# Patient Record
Sex: MALE | Race: BLACK OR AFRICAN AMERICAN | Employment: FULL TIME | URBAN - METROPOLITAN AREA
[De-identification: names, ages, dates, MRNs, and addresses within clinical notes are randomized per-mention and may not be internally consistent; named-entity substitution may affect disease eponyms.]

---

## 2018-10-24 ENCOUNTER — APPOINTMENT (OUTPATIENT)
Dept: GENERAL RADIOLOGY | Age: 48
DRG: 292 | End: 2018-10-24

## 2018-10-24 ENCOUNTER — HOSPITAL ENCOUNTER (INPATIENT)
Age: 48
LOS: 1 days | Discharge: HOME | DRG: 292 | End: 2018-10-25
Attending: EMERGENCY MEDICINE | Admitting: FAMILY MEDICINE

## 2018-10-24 DIAGNOSIS — I50.23 ACUTE ON CHRONIC SYSTOLIC CONGESTIVE HEART FAILURE (HCC): Primary | ICD-10-CM

## 2018-10-24 PROBLEM — I50.9 HEART FAILURE (HCC): Status: ACTIVE | Noted: 2018-10-24

## 2018-10-24 LAB
A/G RATIO: 1.5 (ref 1.1–2.2)
ALBUMIN SERPL-MCNC: 4.1 G/DL (ref 3.4–5)
ALP BLD-CCNC: 85 U/L (ref 40–129)
ALT SERPL-CCNC: 20 U/L (ref 10–40)
ANION GAP SERPL CALCULATED.3IONS-SCNC: 14 MMOL/L (ref 3–16)
AST SERPL-CCNC: 28 U/L (ref 15–37)
BASE EXCESS VENOUS: 1 (ref -3–3)
BASOPHILS ABSOLUTE: 0.1 K/UL (ref 0–0.2)
BASOPHILS RELATIVE PERCENT: 0.7 %
BILIRUB SERPL-MCNC: 0.3 MG/DL (ref 0–1)
BUN BLDV-MCNC: 11 MG/DL (ref 7–20)
CALCIUM SERPL-MCNC: 9.3 MG/DL (ref 8.3–10.6)
CHLORIDE BLD-SCNC: 101 MMOL/L (ref 99–110)
CO2: 24 MMOL/L (ref 21–32)
CREAT SERPL-MCNC: 1.1 MG/DL (ref 0.9–1.3)
EOSINOPHILS ABSOLUTE: 0.1 K/UL (ref 0–0.6)
EOSINOPHILS RELATIVE PERCENT: 1.6 %
GFR AFRICAN AMERICAN: >60
GFR NON-AFRICAN AMERICAN: >60
GLOBULIN: 2.8 G/DL
GLUCOSE BLD-MCNC: 159 MG/DL (ref 70–99)
HCO3 VENOUS: 25.9 MMOL/L (ref 23–29)
HCT VFR BLD CALC: 42.5 % (ref 40.5–52.5)
HEMOGLOBIN: 14 G/DL (ref 13.5–17.5)
LYMPHOCYTES ABSOLUTE: 1.3 K/UL (ref 1–5.1)
LYMPHOCYTES RELATIVE PERCENT: 17.9 %
MCH RBC QN AUTO: 30.6 PG (ref 26–34)
MCHC RBC AUTO-ENTMCNC: 33 G/DL (ref 31–36)
MCV RBC AUTO: 92.8 FL (ref 80–100)
MONOCYTES ABSOLUTE: 0.5 K/UL (ref 0–1.3)
MONOCYTES RELATIVE PERCENT: 7 %
NEUTROPHILS ABSOLUTE: 5.4 K/UL (ref 1.7–7.7)
NEUTROPHILS RELATIVE PERCENT: 72.8 %
O2 SAT, VEN: 86 %
PCO2, VEN: 41.4 MM HG (ref 40–50)
PDW BLD-RTO: 13.4 % (ref 12.4–15.4)
PERFORMED ON: NORMAL
PH VENOUS: 7.4 (ref 7.35–7.45)
PLATELET # BLD: 222 K/UL (ref 135–450)
PMV BLD AUTO: 7.8 FL (ref 5–10.5)
PO2, VEN: 52 MM HG
POC SAMPLE TYPE: NORMAL
POTASSIUM REFLEX MAGNESIUM: 4.2 MMOL/L (ref 3.5–5.1)
PRO-BNP: 544 PG/ML (ref 0–124)
RBC # BLD: 4.58 M/UL (ref 4.2–5.9)
SODIUM BLD-SCNC: 139 MMOL/L (ref 136–145)
TCO2 CALC VENOUS: 27 MMOL/L
TOTAL PROTEIN: 6.9 G/DL (ref 6.4–8.2)
TROPONIN: <0.01 NG/ML
WBC # BLD: 7.5 K/UL (ref 4–11)

## 2018-10-24 PROCEDURE — 71046 X-RAY EXAM CHEST 2 VIEWS: CPT

## 2018-10-24 PROCEDURE — 99285 EMERGENCY DEPT VISIT HI MDM: CPT

## 2018-10-24 PROCEDURE — 84484 ASSAY OF TROPONIN QUANT: CPT

## 2018-10-24 PROCEDURE — 93005 ELECTROCARDIOGRAM TRACING: CPT | Performed by: EMERGENCY MEDICINE

## 2018-10-24 PROCEDURE — 96375 TX/PRO/DX INJ NEW DRUG ADDON: CPT

## 2018-10-24 PROCEDURE — 6360000002 HC RX W HCPCS: Performed by: EMERGENCY MEDICINE

## 2018-10-24 PROCEDURE — 82803 BLOOD GASES ANY COMBINATION: CPT

## 2018-10-24 PROCEDURE — 80053 COMPREHEN METABOLIC PANEL: CPT

## 2018-10-24 PROCEDURE — 96374 THER/PROPH/DIAG INJ IV PUSH: CPT

## 2018-10-24 PROCEDURE — 85025 COMPLETE CBC W/AUTO DIFF WBC: CPT

## 2018-10-24 PROCEDURE — 2580000003 HC RX 258: Performed by: STUDENT IN AN ORGANIZED HEALTH CARE EDUCATION/TRAINING PROGRAM

## 2018-10-24 PROCEDURE — 1200000000 HC SEMI PRIVATE

## 2018-10-24 PROCEDURE — 6370000000 HC RX 637 (ALT 250 FOR IP): Performed by: STUDENT IN AN ORGANIZED HEALTH CARE EDUCATION/TRAINING PROGRAM

## 2018-10-24 PROCEDURE — 6370000000 HC RX 637 (ALT 250 FOR IP): Performed by: EMERGENCY MEDICINE

## 2018-10-24 PROCEDURE — 6360000002 HC RX W HCPCS: Performed by: STUDENT IN AN ORGANIZED HEALTH CARE EDUCATION/TRAINING PROGRAM

## 2018-10-24 PROCEDURE — 83880 ASSAY OF NATRIURETIC PEPTIDE: CPT

## 2018-10-24 RX ORDER — FUROSEMIDE 10 MG/ML
40 INJECTION INTRAMUSCULAR; INTRAVENOUS ONCE
Status: COMPLETED | OUTPATIENT
Start: 2018-10-24 | End: 2018-10-24

## 2018-10-24 RX ORDER — METOLAZONE 2.5 MG/1
2.5 TABLET ORAL DAILY
Status: DISCONTINUED | OUTPATIENT
Start: 2018-10-25 | End: 2018-10-25 | Stop reason: HOSPADM

## 2018-10-24 RX ORDER — SODIUM CHLORIDE 0.9 % (FLUSH) 0.9 %
10 SYRINGE (ML) INJECTION EVERY 12 HOURS SCHEDULED
Status: DISCONTINUED | OUTPATIENT
Start: 2018-10-24 | End: 2018-10-25 | Stop reason: HOSPADM

## 2018-10-24 RX ORDER — LISINOPRIL 20 MG/1
20 TABLET ORAL ONCE
Status: COMPLETED | OUTPATIENT
Start: 2018-10-24 | End: 2018-10-24

## 2018-10-24 RX ORDER — ONDANSETRON 2 MG/ML
4 INJECTION INTRAMUSCULAR; INTRAVENOUS EVERY 6 HOURS PRN
Status: DISCONTINUED | OUTPATIENT
Start: 2018-10-24 | End: 2018-10-25 | Stop reason: HOSPADM

## 2018-10-24 RX ORDER — CARVEDILOL 3.12 MG/1
3.12 TABLET ORAL 2 TIMES DAILY WITH MEALS
Status: DISCONTINUED | OUTPATIENT
Start: 2018-10-25 | End: 2018-10-25 | Stop reason: HOSPADM

## 2018-10-24 RX ORDER — SODIUM CHLORIDE 0.9 % (FLUSH) 0.9 %
10 SYRINGE (ML) INJECTION PRN
Status: DISCONTINUED | OUTPATIENT
Start: 2018-10-24 | End: 2018-10-25 | Stop reason: HOSPADM

## 2018-10-24 RX ORDER — FUROSEMIDE 10 MG/ML
40 INJECTION INTRAMUSCULAR; INTRAVENOUS 2 TIMES DAILY
Status: DISCONTINUED | OUTPATIENT
Start: 2018-10-24 | End: 2018-10-25

## 2018-10-24 RX ORDER — KETOROLAC TROMETHAMINE 30 MG/ML
15 INJECTION, SOLUTION INTRAMUSCULAR; INTRAVENOUS ONCE
Status: COMPLETED | OUTPATIENT
Start: 2018-10-24 | End: 2018-10-24

## 2018-10-24 RX ORDER — ACETAMINOPHEN 500 MG
1000 TABLET ORAL EVERY 6 HOURS PRN
Status: DISCONTINUED | OUTPATIENT
Start: 2018-10-24 | End: 2018-10-25 | Stop reason: HOSPADM

## 2018-10-24 RX ORDER — LISINOPRIL 5 MG/1
5 TABLET ORAL DAILY
Status: DISCONTINUED | OUTPATIENT
Start: 2018-10-25 | End: 2018-10-25 | Stop reason: HOSPADM

## 2018-10-24 RX ADMIN — ACETAMINOPHEN 1000 MG: 500 TABLET ORAL at 23:51

## 2018-10-24 RX ADMIN — FUROSEMIDE 40 MG: 10 INJECTION, SOLUTION INTRAMUSCULAR; INTRAVENOUS at 23:51

## 2018-10-24 RX ADMIN — ASPIRIN 325 MG: 325 TABLET, DELAYED RELEASE ORAL at 18:26

## 2018-10-24 RX ADMIN — LISINOPRIL 20 MG: 20 TABLET ORAL at 18:26

## 2018-10-24 RX ADMIN — KETOROLAC TROMETHAMINE 15 MG: 30 INJECTION, SOLUTION INTRAMUSCULAR at 17:03

## 2018-10-24 RX ADMIN — FUROSEMIDE 40 MG: 10 INJECTION, SOLUTION INTRAMUSCULAR; INTRAVENOUS at 18:26

## 2018-10-24 RX ADMIN — Medication 10 ML: at 23:51

## 2018-10-24 ASSESSMENT — ENCOUNTER SYMPTOMS
DIARRHEA: 0
VOMITING: 0
ABDOMINAL PAIN: 0
SHORTNESS OF BREATH: 1
COUGH: 0
NAUSEA: 0

## 2018-10-24 ASSESSMENT — PAIN DESCRIPTION - FREQUENCY: FREQUENCY: INTERMITTENT

## 2018-10-24 ASSESSMENT — PAIN DESCRIPTION - PROGRESSION: CLINICAL_PROGRESSION: GRADUALLY WORSENING

## 2018-10-24 ASSESSMENT — PAIN DESCRIPTION - LOCATION: LOCATION: FOOT

## 2018-10-24 ASSESSMENT — PAIN DESCRIPTION - ONSET: ONSET: GRADUAL

## 2018-10-24 ASSESSMENT — PAIN DESCRIPTION - PAIN TYPE: TYPE: NEUROPATHIC PAIN

## 2018-10-24 ASSESSMENT — PAIN SCALES - GENERAL
PAINLEVEL_OUTOF10: 7
PAINLEVEL_OUTOF10: 5

## 2018-10-24 ASSESSMENT — PAIN DESCRIPTION - DESCRIPTORS: DESCRIPTORS: ACHING;BURNING;CONSTANT

## 2018-10-24 ASSESSMENT — PAIN DESCRIPTION - ORIENTATION: ORIENTATION: RIGHT;LEFT

## 2018-10-25 VITALS
DIASTOLIC BLOOD PRESSURE: 71 MMHG | OXYGEN SATURATION: 95 % | WEIGHT: 315 LBS | BODY MASS INDEX: 42.66 KG/M2 | TEMPERATURE: 97.9 F | RESPIRATION RATE: 20 BRPM | HEIGHT: 72 IN | SYSTOLIC BLOOD PRESSURE: 137 MMHG | HEART RATE: 91 BPM

## 2018-10-25 LAB
ANION GAP SERPL CALCULATED.3IONS-SCNC: 16 MMOL/L (ref 3–16)
BASOPHILS ABSOLUTE: 0 K/UL (ref 0–0.2)
BASOPHILS RELATIVE PERCENT: 0.5 %
BILIRUBIN URINE: NEGATIVE
BLOOD, URINE: NEGATIVE
BUN BLDV-MCNC: 14 MG/DL (ref 7–20)
CALCIUM SERPL-MCNC: 9.9 MG/DL (ref 8.3–10.6)
CHLORIDE BLD-SCNC: 101 MMOL/L (ref 99–110)
CHOLESTEROL, TOTAL: 244 MG/DL (ref 0–199)
CLARITY: CLEAR
CO2: 22 MMOL/L (ref 21–32)
COLOR: YELLOW
CREAT SERPL-MCNC: 1.1 MG/DL (ref 0.9–1.3)
EOSINOPHILS ABSOLUTE: 0.2 K/UL (ref 0–0.6)
EOSINOPHILS RELATIVE PERCENT: 2.5 %
GFR AFRICAN AMERICAN: >60
GFR NON-AFRICAN AMERICAN: >60
GLUCOSE BLD-MCNC: 105 MG/DL (ref 70–99)
GLUCOSE BLD-MCNC: 167 MG/DL (ref 70–99)
GLUCOSE URINE: NEGATIVE MG/DL
HCT VFR BLD CALC: 43.3 % (ref 40.5–52.5)
HDLC SERPL-MCNC: 43 MG/DL (ref 40–60)
HEMOGLOBIN: 14.3 G/DL (ref 13.5–17.5)
KETONES, URINE: NEGATIVE MG/DL
LDL CHOLESTEROL CALCULATED: 153 MG/DL
LEUKOCYTE ESTERASE, URINE: NEGATIVE
LV EF: 23 %
LVEF MODALITY: NORMAL
LYMPHOCYTES ABSOLUTE: 1.6 K/UL (ref 1–5.1)
LYMPHOCYTES RELATIVE PERCENT: 22.9 %
MAGNESIUM: 2 MG/DL (ref 1.8–2.4)
MCH RBC QN AUTO: 31.3 PG (ref 26–34)
MCHC RBC AUTO-ENTMCNC: 32.9 G/DL (ref 31–36)
MCV RBC AUTO: 95 FL (ref 80–100)
MICROSCOPIC EXAMINATION: NORMAL
MONOCYTES ABSOLUTE: 0.5 K/UL (ref 0–1.3)
MONOCYTES RELATIVE PERCENT: 7.6 %
NEUTROPHILS ABSOLUTE: 4.6 K/UL (ref 1.7–7.7)
NEUTROPHILS RELATIVE PERCENT: 66.5 %
NITRITE, URINE: NEGATIVE
PDW BLD-RTO: 13.7 % (ref 12.4–15.4)
PERFORMED ON: ABNORMAL
PH UA: 6
PLATELET # BLD: 241 K/UL (ref 135–450)
PMV BLD AUTO: 7.8 FL (ref 5–10.5)
POTASSIUM SERPL-SCNC: 3.8 MMOL/L (ref 3.5–5.1)
PROTEIN UA: NEGATIVE MG/DL
RBC # BLD: 4.56 M/UL (ref 4.2–5.9)
SODIUM BLD-SCNC: 139 MMOL/L (ref 136–145)
SPECIFIC GRAVITY UA: 1.02
TRIGL SERPL-MCNC: 238 MG/DL (ref 0–150)
URINE TYPE: NORMAL
UROBILINOGEN, URINE: 0.2 E.U./DL
VLDLC SERPL CALC-MCNC: 48 MG/DL
WBC # BLD: 7 K/UL (ref 4–11)

## 2018-10-25 PROCEDURE — 6360000002 HC RX W HCPCS: Performed by: STUDENT IN AN ORGANIZED HEALTH CARE EDUCATION/TRAINING PROGRAM

## 2018-10-25 PROCEDURE — 2580000003 HC RX 258: Performed by: STUDENT IN AN ORGANIZED HEALTH CARE EDUCATION/TRAINING PROGRAM

## 2018-10-25 PROCEDURE — 6370000000 HC RX 637 (ALT 250 FOR IP): Performed by: FAMILY MEDICINE

## 2018-10-25 PROCEDURE — 81003 URINALYSIS AUTO W/O SCOPE: CPT

## 2018-10-25 PROCEDURE — 80048 BASIC METABOLIC PNL TOTAL CA: CPT

## 2018-10-25 PROCEDURE — 6370000000 HC RX 637 (ALT 250 FOR IP): Performed by: STUDENT IN AN ORGANIZED HEALTH CARE EDUCATION/TRAINING PROGRAM

## 2018-10-25 PROCEDURE — 80061 LIPID PANEL: CPT

## 2018-10-25 PROCEDURE — 85025 COMPLETE CBC W/AUTO DIFF WBC: CPT

## 2018-10-25 PROCEDURE — 83735 ASSAY OF MAGNESIUM: CPT

## 2018-10-25 PROCEDURE — 83036 HEMOGLOBIN GLYCOSYLATED A1C: CPT

## 2018-10-25 PROCEDURE — 6360000004 HC RX CONTRAST MEDICATION: Performed by: INTERNAL MEDICINE

## 2018-10-25 PROCEDURE — C8929 TTE W OR WO FOL WCON,DOPPLER: HCPCS

## 2018-10-25 PROCEDURE — 36415 COLL VENOUS BLD VENIPUNCTURE: CPT

## 2018-10-25 RX ORDER — ATORVASTATIN CALCIUM 40 MG/1
40 TABLET, FILM COATED ORAL DAILY
Qty: 30 TABLET | Refills: 3 | Status: SHIPPED | OUTPATIENT
Start: 2018-10-25 | End: 2019-04-29 | Stop reason: ALTCHOICE

## 2018-10-25 RX ORDER — CARVEDILOL 3.12 MG/1
3.12 TABLET ORAL 2 TIMES DAILY WITH MEALS
Qty: 60 TABLET | Refills: 3 | Status: SHIPPED | OUTPATIENT
Start: 2018-10-25 | End: 2018-11-13 | Stop reason: ALTCHOICE

## 2018-10-25 RX ORDER — LISINOPRIL 5 MG/1
5 TABLET ORAL DAILY
Qty: 30 TABLET | Refills: 3 | Status: SHIPPED | OUTPATIENT
Start: 2018-10-26 | End: 2018-11-13 | Stop reason: SINTOL

## 2018-10-25 RX ORDER — FUROSEMIDE 40 MG/1
40 TABLET ORAL DAILY
Qty: 60 TABLET | Refills: 3 | Status: SHIPPED | OUTPATIENT
Start: 2018-10-26 | End: 2018-10-25

## 2018-10-25 RX ORDER — FUROSEMIDE 40 MG/1
40 TABLET ORAL DAILY
Status: DISCONTINUED | OUTPATIENT
Start: 2018-10-26 | End: 2018-10-25 | Stop reason: HOSPADM

## 2018-10-25 RX ORDER — FUROSEMIDE 40 MG/1
40 TABLET ORAL 2 TIMES DAILY
Qty: 60 TABLET | Refills: 3 | Status: SHIPPED | OUTPATIENT
Start: 2018-10-25 | End: 2019-03-11 | Stop reason: SDUPTHER

## 2018-10-25 RX ORDER — FUROSEMIDE 40 MG/1
40 TABLET ORAL ONCE
Status: COMPLETED | OUTPATIENT
Start: 2018-10-25 | End: 2018-10-25

## 2018-10-25 RX ADMIN — FUROSEMIDE 40 MG: 10 INJECTION, SOLUTION INTRAMUSCULAR; INTRAVENOUS at 08:38

## 2018-10-25 RX ADMIN — FUROSEMIDE 40 MG: 40 TABLET ORAL at 16:40

## 2018-10-25 RX ADMIN — CARVEDILOL 3.12 MG: 3.12 TABLET, FILM COATED ORAL at 16:40

## 2018-10-25 RX ADMIN — LISINOPRIL 5 MG: 5 TABLET ORAL at 08:38

## 2018-10-25 RX ADMIN — Medication 10 ML: at 08:38

## 2018-10-25 RX ADMIN — METOLAZONE 2.5 MG: 2.5 TABLET ORAL at 00:07

## 2018-10-25 RX ADMIN — CARVEDILOL 3.12 MG: 3.12 TABLET, FILM COATED ORAL at 08:38

## 2018-10-25 RX ADMIN — PERFLUTREN 2.2 MG: 6.52 INJECTION, SUSPENSION INTRAVENOUS at 10:33

## 2018-10-25 ASSESSMENT — PAIN DESCRIPTION - ONSET: ONSET: ON-GOING

## 2018-10-25 ASSESSMENT — PAIN DESCRIPTION - PROGRESSION: CLINICAL_PROGRESSION: NOT CHANGED

## 2018-10-25 ASSESSMENT — PAIN SCALES - GENERAL
PAINLEVEL_OUTOF10: 0
PAINLEVEL_OUTOF10: 5
PAINLEVEL_OUTOF10: 0

## 2018-10-25 ASSESSMENT — PAIN DESCRIPTION - FREQUENCY: FREQUENCY: INTERMITTENT

## 2018-10-25 ASSESSMENT — PAIN DESCRIPTION - LOCATION: LOCATION: FOOT

## 2018-10-25 ASSESSMENT — PAIN DESCRIPTION - DESCRIPTORS: DESCRIPTORS: ACHING;BURNING;CONSTANT

## 2018-10-25 ASSESSMENT — PAIN DESCRIPTION - ORIENTATION: ORIENTATION: RIGHT;LEFT

## 2018-10-25 ASSESSMENT — PAIN DESCRIPTION - PAIN TYPE: TYPE: NEUROPATHIC PAIN

## 2018-10-25 NOTE — PLAN OF CARE
Problem: Pain:  Goal: Pain level will decrease  Pain level will decrease   Outcome: Ongoing  Pt reports intermittent sciatic pain in legs, however refused AM dose of diclofenac sodium topical gel. Will continue to monitor pain level. Problem: Safety:  Goal: Free from accidental physical injury  Free from accidental physical injury   Outcome: Ongoing  Pt is a low Fall Risk. See Namon Ditto Fall Risk Score. Pt bed in low position, bed wheels locked, non-skid socks in use, and 2/4 side rails up. Pt UAL with steady gait observed. Pt denies dizziness during ambulation, but does report mild SOB with exertion. Call light and belongings left within reach. Pt encouraged to call for assistance. Will continue with hourly rounds for PO intake, pain needs, toileting, and repositioning as needed. Problem: OXYGENATION/RESPIRATORY FUNCTION  Goal: Patient will maintain patent airway  Outcome: Ongoing  SpO2 level 96% on room air. Lungs are clear/diminished to auscultation. 2+ pitting edema present in BLE; pt encouraged to elevate legs while in bed/chair. Pt receiving IV Lasix BID as ordered. I/O being monitored closely. Will continue to monitor and report changes to physician.

## 2018-10-26 LAB
EKG ATRIAL RATE: 102 BPM
EKG DIAGNOSIS: NORMAL
EKG P AXIS: 47 DEGREES
EKG P-R INTERVAL: 168 MS
EKG Q-T INTERVAL: 366 MS
EKG QRS DURATION: 88 MS
EKG QTC CALCULATION (BAZETT): 477 MS
EKG R AXIS: -53 DEGREES
EKG T AXIS: 80 DEGREES
EKG VENTRICULAR RATE: 102 BPM
ESTIMATED AVERAGE GLUCOSE: 137 MG/DL
HBA1C MFR BLD: 6.4 %

## 2018-11-08 ENCOUNTER — TELEPHONE (OUTPATIENT)
Dept: CARDIOLOGY CLINIC | Age: 48
End: 2018-11-08

## 2018-11-08 NOTE — TELEPHONE ENCOUNTER
----- Message from Windy Mei, [address] - CNP sent at 11/7/2018  3:27 PM EST -----  Chucky head, this is a pt hosp with worsening HF, missed f/u with me and needs to establish with HF team. Can you call him and see if you can him rescheduled with either GB or myself?  Thanks, Missy Ga

## 2018-11-13 ENCOUNTER — OFFICE VISIT (OUTPATIENT)
Dept: CARDIOLOGY CLINIC | Age: 48
End: 2018-11-13

## 2018-11-13 VITALS
DIASTOLIC BLOOD PRESSURE: 86 MMHG | HEART RATE: 80 BPM | SYSTOLIC BLOOD PRESSURE: 130 MMHG | BODY MASS INDEX: 60.3 KG/M2 | WEIGHT: 315 LBS

## 2018-11-13 DIAGNOSIS — I50.21 ACUTE SYSTOLIC CONGESTIVE HEART FAILURE (HCC): Primary | ICD-10-CM

## 2018-11-13 DIAGNOSIS — I10 ESSENTIAL HYPERTENSION: ICD-10-CM

## 2018-11-13 DIAGNOSIS — I42.0 DILATED CARDIOMYOPATHY (HCC): ICD-10-CM

## 2018-11-13 PROBLEM — I50.9 HEART FAILURE (HCC): Status: RESOLVED | Noted: 2018-10-24 | Resolved: 2018-11-13

## 2018-11-13 PROCEDURE — 99214 OFFICE O/P EST MOD 30 MIN: CPT | Performed by: NURSE PRACTITIONER

## 2018-11-13 RX ORDER — LOSARTAN POTASSIUM 25 MG/1
25 TABLET ORAL DAILY
Qty: 90 TABLET | Refills: 1 | Status: SHIPPED | OUTPATIENT
Start: 2018-11-13 | End: 2019-01-08 | Stop reason: SDUPTHER

## 2018-11-13 RX ORDER — METOPROLOL SUCCINATE 25 MG/1
25 TABLET, EXTENDED RELEASE ORAL DAILY
Qty: 30 TABLET | Refills: 3 | Status: SHIPPED | OUTPATIENT
Start: 2018-11-13 | End: 2019-02-05 | Stop reason: SDUPTHER

## 2018-11-13 RX ORDER — SPIRONOLACTONE 25 MG/1
12.5 TABLET ORAL DAILY
Qty: 30 TABLET | Refills: 3 | Status: SHIPPED | OUTPATIENT
Start: 2018-11-13 | End: 2019-07-14 | Stop reason: SDUPTHER

## 2018-11-13 ASSESSMENT — ENCOUNTER SYMPTOMS
WHEEZING: 0
COUGH: 1
SHORTNESS OF BREATH: 1
GASTROINTESTINAL NEGATIVE: 1

## 2018-11-27 ENCOUNTER — OFFICE VISIT (OUTPATIENT)
Dept: CARDIOLOGY CLINIC | Age: 48
End: 2018-11-27

## 2018-11-27 VITALS
HEART RATE: 96 BPM | WEIGHT: 315 LBS | DIASTOLIC BLOOD PRESSURE: 100 MMHG | SYSTOLIC BLOOD PRESSURE: 140 MMHG | BODY MASS INDEX: 60.05 KG/M2

## 2018-11-27 DIAGNOSIS — I50.42 CHRONIC COMBINED SYSTOLIC AND DIASTOLIC HEART FAILURE (HCC): ICD-10-CM

## 2018-11-27 DIAGNOSIS — I10 ESSENTIAL HYPERTENSION: ICD-10-CM

## 2018-11-27 DIAGNOSIS — I42.0 DILATED CARDIOMYOPATHY (HCC): Primary | ICD-10-CM

## 2018-11-27 PROCEDURE — 99214 OFFICE O/P EST MOD 30 MIN: CPT | Performed by: INTERNAL MEDICINE

## 2018-11-27 NOTE — PROGRESS NOTES
(!) 444 lb 9.6 oz (201.7 kg)   10/25/18 (!) 435 lb 14.4 oz (197.7 kg)         General Appearance:  Alert, cooperative, no distress, appears stated age Appropriate weight   Head:  Normocephalic, without obvious abnormality, atraumatic   Eyes:  PERRL, conjunctiva/corneas clear EOM intact  Ears normal   Throat no lesions       Nose: Nares normal, no drainage or sinus tenderness   Throat: Lips, mucosa, and tongue normal   Neck: Supple, symmetrical, trachea midline, no adenopathy, thyroid: not enlarged, symmetric, no tenderness/mass/nodules, no carotid bruit       Lungs:   Clear to auscultation bilaterally, respirations unlabored   Chest Wall:  No tenderness or deformity   Heart:  Regular rate and rhythm, S1, S2 normal, no murmur, no rub or gallop, no jvd, 2+ edema   Abdomen:   Soft, non-tender, bowel sounds active all four quadrants,  no masses, no organomegaly       Extremities: Extremities normal, atraumatic, no cyanosis   Pulses: 2+ and symmetric   Skin: Skin color, texture, turgor normal, no rashes or lesions   Pysch: Normal mood and affect   Neurologic: Normal gross motor and sensory exam.  Cranial nerves intact        Labs:     Lab Results   Component Value Date    WBC 7.0 10/25/2018    HGB 14.3 10/25/2018    HCT 43.3 10/25/2018    MCV 95.0 10/25/2018     10/25/2018     Lab Results   Component Value Date     10/25/2018    K 3.8 10/25/2018     10/25/2018    CO2 22 10/25/2018    BUN 14 10/25/2018    CREATININE 1.1 10/25/2018    GLUCOSE 167 (H) 10/25/2018    CALCIUM 9.9 10/25/2018    PROT 6.9 10/24/2018    LABALBU 4.1 10/24/2018    BILITOT 0.3 10/24/2018    ALKPHOS 85 10/24/2018    AST 28 10/24/2018    ALT 20 10/24/2018    LABGLOM >60 10/25/2018    GFRAA >60 10/25/2018    AGRATIO 1.5 10/24/2018    GLOB 2.8 10/24/2018         Lab Results   Component Value Date    CHOL 244 (H) 10/25/2018    CHOL 180 11/01/2012     Lab Results   Component Value Date    TRIG 238 (H) 10/25/2018    TRIG 156 (H) 11/01/2012     Lab Results   Component Value Date    HDL 43 10/25/2018    HDL 39 11/01/2012     Lab Results   Component Value Date    LDLCALC 153 (H) 10/25/2018    LDLCALC 110 11/01/2012     Lab Results   Component Value Date    LABVLDL 48 10/25/2018     No results found for: [facility]    Lab Results   Component Value Date    INR 1.02 12/01/2009    PROTIME 11.4 12/01/2009       The 10-year ASCVD risk score (Haylee Lynch, et al., 2013) is: 9.9%    Values used to calculate the score:      Age: 53 years      Sex: Male      Is Non- : Yes      Diabetic: No      Tobacco smoker: No      Systolic Blood Pressure: 305 mmHg      Is BP treated: Yes      HDL Cholesterol: 43 mg/dL      Total Cholesterol: 244 mg/dL    Imaging:     I have personally reviewed the ECG: sinus tach 100s, poor R wave progression, cannot exclude inferior MI. Assessment / Plan:      Diagnosis Orders   1. Dilated cardiomyopathy (HCC)  CT Cardiac Calcium Scoring   2. Chronic combined systolic and diastolic heart failure (HCC)  CT Cardiac Calcium Scoring   3. Essential hypertension  CT Cardiac Calcium Scoring        1. HFrEF:   It is likely nonischemic in origin (severe morbid obesity, likely untreated CARLITOS, uncontrolled HTN due to poor compliance with meds). I cannot exclude ischemia though. His NYHA FC is II, stage C, appears euvolemic and hemo stable. His compliance with HF meds is a major issue and I have emphasized that during his visit today.     -Patient is willing to start taking Toprol 25 daily, losartan 25, rocael 12.5 daily. I will not make any changes at this time.   -He wants to continue taking lasix total dose of 120 mg q pm to maintain euvolemia.   -Will obtain a calcium score to r/o CAD. -I asked him to start checking his BP with a wrist cuff (his arm is too big for regular adult cuff)    No Follow-up on file.     I have personally reviewed the reports and images of labs, radiological studies, cardiac studies

## 2018-12-20 ENCOUNTER — TELEPHONE (OUTPATIENT)
Dept: CARDIOLOGY CLINIC | Age: 48
End: 2018-12-20

## 2018-12-20 NOTE — TELEPHONE ENCOUNTER
Pt experiencing heel pain advised him to contact PCP. Pt also ask me to transfer him [facility] scheduling to get Calcium Score test scheduled.

## 2018-12-28 ENCOUNTER — TELEPHONE (OUTPATIENT)
Dept: CARDIOLOGY CLINIC | Age: 48
End: 2018-12-28

## 2018-12-28 ENCOUNTER — HOSPITAL ENCOUNTER (OUTPATIENT)
Dept: CT IMAGING | Age: 48
Discharge: HOME | End: 2018-12-28

## 2018-12-28 DIAGNOSIS — I42.0 DILATED CARDIOMYOPATHY (HCC): ICD-10-CM

## 2018-12-28 DIAGNOSIS — I50.42 CHRONIC COMBINED SYSTOLIC AND DIASTOLIC HEART FAILURE (HCC): ICD-10-CM

## 2018-12-28 DIAGNOSIS — I10 ESSENTIAL HYPERTENSION: ICD-10-CM

## 2018-12-28 PROCEDURE — 75571 CT HRT W/O DYE W/CA TEST: CPT

## 2019-01-04 ENCOUNTER — TELEPHONE (OUTPATIENT)
Dept: CARDIOLOGY CLINIC | Age: 49
End: 2019-01-04

## 2019-01-07 ASSESSMENT — ENCOUNTER SYMPTOMS
SHORTNESS OF BREATH: 1
GASTROINTESTINAL NEGATIVE: 1

## 2019-01-08 ENCOUNTER — OFFICE VISIT (OUTPATIENT)
Dept: CARDIOLOGY CLINIC | Age: 49
End: 2019-01-08

## 2019-01-08 ENCOUNTER — TELEPHONE (OUTPATIENT)
Dept: CARDIOLOGY CLINIC | Age: 49
End: 2019-01-08

## 2019-01-08 VITALS
BODY MASS INDEX: 60.76 KG/M2 | SYSTOLIC BLOOD PRESSURE: 148 MMHG | WEIGHT: 315 LBS | HEART RATE: 98 BPM | DIASTOLIC BLOOD PRESSURE: 96 MMHG

## 2019-01-08 DIAGNOSIS — I10 ESSENTIAL HYPERTENSION: ICD-10-CM

## 2019-01-08 DIAGNOSIS — I42.0 DILATED CARDIOMYOPATHY (HCC): ICD-10-CM

## 2019-01-08 DIAGNOSIS — I50.21 ACUTE SYSTOLIC CONGESTIVE HEART FAILURE (HCC): Primary | ICD-10-CM

## 2019-01-08 PROCEDURE — 99214 OFFICE O/P EST MOD 30 MIN: CPT | Performed by: NURSE PRACTITIONER

## 2019-01-08 RX ORDER — LOSARTAN POTASSIUM 50 MG/1
50 TABLET ORAL DAILY
Qty: 90 TABLET | Refills: 3 | Status: SHIPPED | OUTPATIENT
Start: 2019-01-08 | End: 2019-03-05 | Stop reason: ALTCHOICE

## 2019-01-08 ASSESSMENT — ENCOUNTER SYMPTOMS
WHEEZING: 1
COUGH: 0

## 2019-01-10 DIAGNOSIS — I50.21 ACUTE SYSTOLIC CONGESTIVE HEART FAILURE (HCC): ICD-10-CM

## 2019-01-10 LAB
ANION GAP SERPL CALCULATED.3IONS-SCNC: 16 MMOL/L (ref 3–16)
BUN BLDV-MCNC: 13 MG/DL (ref 7–20)
CALCIUM SERPL-MCNC: 9.8 MG/DL (ref 8.3–10.6)
CHLORIDE BLD-SCNC: 103 MMOL/L (ref 99–110)
CO2: 23 MMOL/L (ref 21–32)
CREAT SERPL-MCNC: 1 MG/DL (ref 0.9–1.3)
GFR AFRICAN AMERICAN: >60
GFR NON-AFRICAN AMERICAN: >60
GLUCOSE BLD-MCNC: 167 MG/DL (ref 70–99)
POTASSIUM SERPL-SCNC: 4 MMOL/L (ref 3.5–5.1)
PRO-BNP: 95 PG/ML (ref 0–124)
SODIUM BLD-SCNC: 142 MMOL/L (ref 136–145)

## 2019-01-11 ENCOUNTER — TELEPHONE (OUTPATIENT)
Dept: CARDIOLOGY CLINIC | Age: 49
End: 2019-01-11

## 2019-02-04 ASSESSMENT — ENCOUNTER SYMPTOMS: GASTROINTESTINAL NEGATIVE: 1

## 2019-02-05 ENCOUNTER — OFFICE VISIT (OUTPATIENT)
Dept: CARDIOLOGY CLINIC | Age: 49
End: 2019-02-05

## 2019-02-05 VITALS
WEIGHT: 315 LBS | SYSTOLIC BLOOD PRESSURE: 128 MMHG | DIASTOLIC BLOOD PRESSURE: 100 MMHG | BODY MASS INDEX: 60.76 KG/M2 | HEART RATE: 100 BPM

## 2019-02-05 DIAGNOSIS — I50.42 CHRONIC COMBINED SYSTOLIC AND DIASTOLIC HEART FAILURE (HCC): Primary | ICD-10-CM

## 2019-02-05 DIAGNOSIS — I42.0 DILATED CARDIOMYOPATHY (HCC): ICD-10-CM

## 2019-02-05 DIAGNOSIS — I10 ESSENTIAL HYPERTENSION: ICD-10-CM

## 2019-02-05 PROBLEM — I50.22 CHRONIC SYSTOLIC CONGESTIVE HEART FAILURE (HCC): Status: RESOLVED | Noted: 2018-11-13 | Resolved: 2019-02-05

## 2019-02-05 PROBLEM — I50.22 CHRONIC SYSTOLIC CONGESTIVE HEART FAILURE (HCC): Status: ACTIVE | Noted: 2018-11-13

## 2019-02-05 PROCEDURE — 99213 OFFICE O/P EST LOW 20 MIN: CPT | Performed by: NURSE PRACTITIONER

## 2019-02-05 RX ORDER — METOPROLOL SUCCINATE 50 MG/1
100 TABLET, EXTENDED RELEASE ORAL DAILY
Qty: 120 TABLET | Refills: 3 | Status: SHIPPED | OUTPATIENT
Start: 2019-02-05 | End: 2019-08-07 | Stop reason: SDUPTHER

## 2019-02-05 ASSESSMENT — ENCOUNTER SYMPTOMS: RESPIRATORY NEGATIVE: 1

## 2019-03-01 ASSESSMENT — ENCOUNTER SYMPTOMS
GASTROINTESTINAL NEGATIVE: 1
RESPIRATORY NEGATIVE: 1

## 2019-03-05 ENCOUNTER — OFFICE VISIT (OUTPATIENT)
Dept: CARDIOLOGY CLINIC | Age: 49
End: 2019-03-05

## 2019-03-05 VITALS
HEART RATE: 80 BPM | WEIGHT: 315 LBS | SYSTOLIC BLOOD PRESSURE: 140 MMHG | BODY MASS INDEX: 60.33 KG/M2 | DIASTOLIC BLOOD PRESSURE: 90 MMHG

## 2019-03-05 DIAGNOSIS — I50.22 CHRONIC SYSTOLIC CONGESTIVE HEART FAILURE (HCC): Primary | ICD-10-CM

## 2019-03-05 DIAGNOSIS — I50.42 CHRONIC COMBINED SYSTOLIC AND DIASTOLIC HEART FAILURE (HCC): ICD-10-CM

## 2019-03-05 DIAGNOSIS — I10 ESSENTIAL HYPERTENSION: ICD-10-CM

## 2019-03-05 DIAGNOSIS — I42.0 DILATED CARDIOMYOPATHY (HCC): ICD-10-CM

## 2019-03-05 PROCEDURE — 99213 OFFICE O/P EST LOW 20 MIN: CPT | Performed by: NURSE PRACTITIONER

## 2019-03-11 ENCOUNTER — TELEPHONE (OUTPATIENT)
Dept: CARDIOLOGY CLINIC | Age: 49
End: 2019-03-11

## 2019-03-11 RX ORDER — VALSARTAN 320 MG/1
320 TABLET ORAL DAILY
Qty: 90 TABLET | Refills: 3 | Status: SHIPPED | OUTPATIENT
Start: 2019-03-11 | End: 2019-08-07 | Stop reason: SDUPTHER

## 2019-03-11 RX ORDER — FUROSEMIDE 40 MG/1
40 TABLET ORAL 2 TIMES DAILY
Qty: 60 TABLET | Refills: 3 | Status: SHIPPED | OUTPATIENT
Start: 2019-03-11 | End: 2019-05-10 | Stop reason: SDUPTHER

## 2019-03-11 RX ORDER — FUROSEMIDE 40 MG/1
40 TABLET ORAL 2 TIMES DAILY
Qty: 60 TABLET | Refills: 3 | Status: CANCELLED | OUTPATIENT
Start: 2019-03-11

## 2019-03-14 ENCOUNTER — HOSPITAL ENCOUNTER (OUTPATIENT)
Dept: NON INVASIVE DIAGNOSTICS | Age: 49
Discharge: HOME | End: 2019-03-14

## 2019-03-14 DIAGNOSIS — I50.22 CHRONIC SYSTOLIC CONGESTIVE HEART FAILURE (HCC): ICD-10-CM

## 2019-03-14 LAB
LV EF: 43 %
LVEF MODALITY: NORMAL

## 2019-03-14 PROCEDURE — 6360000004 HC RX CONTRAST MEDICATION: Performed by: INTERNAL MEDICINE

## 2019-03-14 PROCEDURE — C8929 TTE W OR WO FOL WCON,DOPPLER: HCPCS

## 2019-03-14 RX ADMIN — PERFLUTREN 2.2 MG: 6.52 INJECTION, SUSPENSION INTRAVENOUS at 10:41

## 2019-04-25 ASSESSMENT — ENCOUNTER SYMPTOMS
RESPIRATORY NEGATIVE: 1
GASTROINTESTINAL NEGATIVE: 1

## 2019-04-25 NOTE — PROGRESS NOTES
[address]   Congestive Heart Failure    PrimaryCare Doctor:  No primary care provider on file. Primary Cardiologist: Bandar Dillon Pt: No    Chief Complaint:  CHF    History of Present Illness:  Jasper Miranda is a 51 y.o. male with PMH obesity, DM, HTN, and HFrEF 2013, lost to f/u who presents today for CHF f/u. Jasper Miranda was admitted 10/24/18 with CHF exacerbation, echo revealed worsened EF from 35 to 25%. We have been working over the past few months to find a medical regimen that both works with his schedule and for the greatest benefit to his cardiac health. Repeat echo in March showed improvement in his EF to 40-45% and today he states that he continues to feel well, c/o intermittent edema and RODRIGUEZ with wheezing when he has \"extra fluid\" but denies CP, palpitations, PND, orthopnea, fatigue, early saiety. He takes lasix usually 40 or 60mg/day, sometimes needs 80mg/day. BP is improved today with higher dose of metoprolol. Started entresto but cost prohibitive, changed to valsartan    Baseline Weight: 435lb   Admit BNP: 544       Sodium Restrictions: 2g  Fluid Restrictions: 48-64 oz/day  Sodium and fluid restriction compliance: fair    Activity: at baseline, exercises regularly with his son  Can you walk 1-2 blocks or do a moderate amount of house/yard work?yes    EF: 40-45%  Echo 3/14/19  Technically difficult examination with Definity.   Mild concentric left ventricular hypertrophy.   The left ventricle appears at the upper limits of normal in size.   Globally mildly reduced left ventricular systolic function with an estimated   ejection fraction of 40-45%.   The aortic root is dilated at 4.2 cm. Echo 10/25/18  Summary   Left ventricular cavity size is dilated.  There is moderate concentric left   ventricular hypertrophy.   Overall left ventricular systolic function appears severely reduced with an   ejection fraction visually estimated at 20-25%.   Global hypokinesis of the left ventricle.   Diastolic filling parameters suggest grade III diastolic dysfunction.   Mild mitral regurgitation is present.   Trivial tricuspid regurgitation.   Estimated pulmonary artery systolic pressure is elevated at 52 mmHg assuming   a right atrial pressure of 15mmHg.   Biatrial enlargement.   The right ventricle is enlarged and hypokinetic.   TAPSE 1.2 cm   RV S velocity 9.3 cm/s   The aortic root is dilated at 4.1 cm. CT Cardiac calcium score 12/28/18  Scans were obtained through the heart without IV contrast. Up-to-date CT equipment with radiation dose reduction techniques       Coronary artery calcium score cannot be obtained as the images are severely grainy due to patient's large abdominal girth. Cardiac size normal. Visualized lung fields clear. Device: No   ICD/Lifevest Counseling: yes - 3/5/19, pt not interested     LVEF<40% and MI<40d No  Revasc <90d No  LVEF<35% and Class II-III HF Yes   GDMT>3months No     NYHA Class: I        Past Medical History:   has a past medical history of CHF (congestive heart failure) ([facility] Diabetes mellitus ([facility] and Hypertension. Surgical History:   has a past surgical history that includes knee surgery. Social History:   reports that he has quit smoking. His smoking use included cigars. He has never used smokeless tobacco. He reports that he drinks alcohol. He reports that he does not use drugs. Family History:   No family history on file. Home Medications:  Prior to Admission medications    Medication Sig Start Date End Date Taking?  Authorizing Provider   valsartan (DIOVAN) 320 MG tablet Take 1 tablet by mouth daily 3/11/19   DEBBIE Rockwell CNP   furosemide (LASIX) 40 MG tablet Take 1 tablet by mouth 2 times daily 3/11/19   Justice Epperson APRN - CNP   metoprolol succinate (TOPROL XL) 50 MG extended release tablet Take 2 tablets by mouth daily At bedtime 2/5/19   DEBBIE Rockwell - CNP   spironolactone (ALDACTONE) 25 MG tablet Take 0.5 tablets by Component Value Date     01/10/2019     10/25/2018     10/24/2018    K 4.0 01/10/2019    K 3.8 10/25/2018    K 4.2 10/24/2018    K 4.7 01/07/2013     01/10/2019     10/25/2018     10/24/2018    CO2 23 01/10/2019    CO2 22 10/25/2018    CO2 24 10/24/2018    PHOS 5.3 11/02/2012    BUN 13 01/10/2019    BUN 14 10/25/2018    BUN 11 10/24/2018    CREATININE 1.0 01/10/2019    CREATININE 1.1 10/25/2018    CREATININE 1.1 10/24/2018     BNP:   Lab Results   Component Value Date    PROBNP 95 01/10/2019    PROBNP 544 10/24/2018     Iron Studies:  No results found for: FERRITIN  Iron Deficiency Anemia:  No IV Iron Therapy:  No  2017 ACC/AHA HF Guidelines:   intravenous iron replacement in patients with New York Heart Association (NYHA) class II and III HF and iron deficiency(ferritin <100 ng/ml or 100-300 ng/ml if transferrin saturation <20%), to improve functional status and QoL. Cardiac Rehab Referral: No, pt does not have time     CARLITOS: never tested, pt not interested    Pt Education: The patient has received education on thefollowing topics: dietary sodium restriction, heart failure medications, the importance of physical activity, symptom management and weight monitoring  SharedClinic Visit:  No    Assessment/Plan:    Encounter Diagnoses   Name     Chronic combined systolic and diastolic heart failure ([address] Compensated    Dilated cardiomyopathy ([address] On BB, Yaw, ARB,  would up-titrate BB at next OV if HR remains >70    Essential hypertension Improved       Instructions:   1. Medications: Continue current medications  2. Labs: with next OV  3. Lifestyle Recommendations: Weigh yourself every day in the morning after urination, call Alexia Wilson if wt increases 2-3lb in one day or 5lb in one week, Limit sodium [id number]/day and fluids to 2L or 64oz/day. Add a fish oil supplement.    4. Follow up: 3-4 months      CHF Resource Line: [phone] - Michelle Ramirez    Heart Failure Websites: www.heart. org  www.cardiosmart. org    Smartphone gala's that can help you keep track of symptoms, weights, and medications:  HeartPartner  MyHeartApp  HeartScribe  WOWME    Nutrition Gala to track calories, carbohydrates and sodium content of food:   CalorieKing      I appreciate the opportunity of cooperating in the care of this individual.    Jose Meng CNP, 4/25/2019, 12:56 PM    QUALITY MEASURES  1. Tobacco Cessation Counseling: NA  2. Retake of BP if >140/90:   NA  3. Documentation to PCP/referring for new patient:  Sent to PCP atclose of office visit  4. CAD patient on anti-platelet: NA  5. CAD patient on STATIN therapy:  NA  6. Patient with CHF and aFib on anticoagulation:  NA   7. PatientEducation:  Yes   8. BB for LVSD :  Yes   9. ACE/ARB for LVSD:  Yes   10.  Left Ventricular Ejection Fraction (LVEF) Assessment:  Yes

## 2019-04-29 ENCOUNTER — OFFICE VISIT (OUTPATIENT)
Dept: CARDIOLOGY CLINIC | Age: 49
End: 2019-04-29

## 2019-04-29 VITALS
SYSTOLIC BLOOD PRESSURE: 132 MMHG | HEART RATE: 85 BPM | WEIGHT: 315 LBS | DIASTOLIC BLOOD PRESSURE: 80 MMHG | BODY MASS INDEX: 60.9 KG/M2

## 2019-04-29 DIAGNOSIS — I50.42 CHRONIC COMBINED SYSTOLIC AND DIASTOLIC HEART FAILURE (HCC): ICD-10-CM

## 2019-04-29 DIAGNOSIS — I10 ESSENTIAL HYPERTENSION: ICD-10-CM

## 2019-04-29 DIAGNOSIS — I42.0 DILATED CARDIOMYOPATHY (HCC): Primary | ICD-10-CM

## 2019-04-29 PROCEDURE — 99213 OFFICE O/P EST LOW 20 MIN: CPT | Performed by: NURSE PRACTITIONER

## 2019-04-29 NOTE — LETTER
[address] [place] [address] Abeba Larson [address]  Phone: [phone]  Fax: [phone]    DEBBIE Lynch CNP        April 29, 2019     Patient: Cathy Richey   YOB: 1970   Date of Visit: 4/29/2019       To Whom it May Concern:    Cathy Richey was seen in my clinic on 4/29/2019. If you have any questions or concerns, please don't hesitate to call.     Sincerely,         DEBBIE Lynch CNP

## 2019-05-10 RX ORDER — FUROSEMIDE 40 MG/1
40 TABLET ORAL 2 TIMES DAILY
Qty: 60 TABLET | Refills: 3 | Status: SHIPPED | OUTPATIENT
Start: 2019-05-10 | End: 2019-08-07 | Stop reason: SDUPTHER

## 2019-05-10 NOTE — TELEPHONE ENCOUNTER
Mr. Altagracia Meza called again, he said wants to make sure this gets filled today since he has no medication for the weekend.

## 2019-05-10 NOTE — TELEPHONE ENCOUNTER
Patient has been taking his lasix two BID so now he is out. He would like refill. Please call him when this has been sent at [phone]. Thanks.      furosemide (LASIX) 40 MG tablet  Take 1 tablet by mouth 2 times daily, Disp-60 tablet, R-3  Normal  Last Dose:Not Recorded    Last refill 3/11/19.

## 2019-05-22 ENCOUNTER — TELEPHONE (OUTPATIENT)
Dept: CARDIOLOGY CLINIC | Age: 49
End: 2019-05-22

## 2019-05-22 NOTE — TELEPHONE ENCOUNTER
Patient called to discuss his medications with Parish Ricketts. Please call him at [phone]. Thanks.

## 2019-05-22 NOTE — TELEPHONE ENCOUNTER
Patient called back for Rodney Mccormick. He said he is worried about cost of his medications. He would like call back about this.

## 2019-06-19 ENCOUNTER — TELEPHONE (OUTPATIENT)
Dept: CARDIOLOGY CLINIC | Age: 49
End: 2019-06-19

## 2019-08-07 ENCOUNTER — OFFICE VISIT (OUTPATIENT)
Dept: CARDIOLOGY CLINIC | Age: 49
End: 2019-08-07

## 2019-08-07 VITALS
BODY MASS INDEX: 59.67 KG/M2 | OXYGEN SATURATION: 93 % | DIASTOLIC BLOOD PRESSURE: 68 MMHG | WEIGHT: 315 LBS | SYSTOLIC BLOOD PRESSURE: 136 MMHG | HEART RATE: 110 BPM

## 2019-08-07 DIAGNOSIS — E78.2 MIXED HYPERLIPIDEMIA: ICD-10-CM

## 2019-08-07 DIAGNOSIS — I50.42 CHRONIC COMBINED SYSTOLIC AND DIASTOLIC HEART FAILURE (HCC): Primary | ICD-10-CM

## 2019-08-07 DIAGNOSIS — I10 ESSENTIAL HYPERTENSION: ICD-10-CM

## 2019-08-07 DIAGNOSIS — I42.0 DILATED CARDIOMYOPATHY (HCC): ICD-10-CM

## 2019-08-07 PROCEDURE — 99214 OFFICE O/P EST MOD 30 MIN: CPT | Performed by: NURSE PRACTITIONER

## 2019-08-07 RX ORDER — FUROSEMIDE 40 MG/1
80 TABLET ORAL DAILY
Qty: 180 TABLET | Refills: 3 | Status: SHIPPED | OUTPATIENT
Start: 2019-08-07 | End: 2020-03-17 | Stop reason: ALTCHOICE

## 2019-08-07 RX ORDER — SPIRONOLACTONE 25 MG/1
25 TABLET ORAL DAILY
Qty: 90 TABLET | Refills: 1 | Status: SHIPPED | OUTPATIENT
Start: 2019-08-07 | End: 2020-02-28

## 2019-08-07 RX ORDER — VALSARTAN 320 MG/1
320 TABLET ORAL DAILY
Qty: 90 TABLET | Refills: 3 | Status: SHIPPED | OUTPATIENT
Start: 2019-08-07 | End: 2020-03-17 | Stop reason: SDUPTHER

## 2019-08-07 RX ORDER — METOPROLOL SUCCINATE 100 MG/1
100 TABLET, EXTENDED RELEASE ORAL DAILY
Qty: 90 TABLET | Refills: 3 | Status: SHIPPED | OUTPATIENT
Start: 2019-08-07 | End: 2020-03-17 | Stop reason: SDUPTHER

## 2019-08-07 NOTE — PROGRESS NOTES
malaise/lethargy, palpitations, polydipsia/polyuria, temperature intolerance or unexpected weight changes  Skin:  No rashes or non-healing ulcers. Physical Exam:  /68   Pulse 110   Wt (!) 440 lb (199.6 kg)   SpO2 93%   BMI 59.67 kg/m²    General (appearance):  No acute distress. + obese  Eyes: anicteric   Neck: soft, No JVD  Ears/Nose/Mouth/Thorat: No cyanosis  CV: RRR   Respiratory:  Clear  GI: soft, non-tender, non-distended  Skin: Warm, dry. No rashes  Neuro/Psych: Alert and oriented x 3. Appropriate behavior  Ext:  No c/c. No edema  Pulses:  2+ carotid. No bruit. Weight  Discharge:    Today: Weight: (!) 440 lb (199.6 kg)      CBC:   Lab Results   Component Value Date    WBC 7.0 10/25/2018    HGB 14.3 10/25/2018    HCT 43.3 10/25/2018    MCV 95.0 10/25/2018     10/25/2018     BMP:  Lab Results   Component Value Date    CREATININE 1.0 01/10/2019    BUN 13 01/10/2019     01/10/2019    K 4.0 01/10/2019     01/10/2019    CO2 23 01/10/2019     Mag:   Lab Results   Component Value Date    MG 2.00 10/25/2018     LIVER PROFILE:   Lab Results   Component Value Date    ALT 20 10/24/2018    AST 28 10/24/2018    ALKPHOS 85 10/24/2018    BILITOT 0.3 10/24/2018     PT/INR:   Lab Results   Component Value Date    INR 1.02 12/01/2009    PROTIME 11.4 12/01/2009     Pro-BNP   Lab Results   Component Value Date    PROBNP 95 01/10/2019    PROBNP 544 10/24/2018     LIPIDS:  No components found for: [facility]  Lab Results   Component Value Date    TRIG 238 (H) 10/25/2018    TRIG 156 (H) 11/01/2012     Lab Results   Component Value Date    HDL 43 10/25/2018    HDL 39 11/01/2012     Lab Results   Component Value Date    LDLCALC 153 (H) 10/25/2018    LDLCALC 110 11/01/2012     Lab Results   Component Value Date    LABVLDL 48 10/25/2018     TSH:  Lab Results   Component Value Date    TSH 1.36 11/02/2012       IMAGING:     3/14/2019 Echo:   Technically difficult examination with Definity.   Mild concentric

## 2019-08-20 ENCOUNTER — TELEPHONE (OUTPATIENT)
Dept: CARDIOLOGY CLINIC | Age: 49
End: 2019-08-20

## 2019-08-21 RX ORDER — HYDROCHLOROTHIAZIDE 25 MG/1
25 TABLET ORAL DAILY
Qty: 90 TABLET | Refills: 3 | Status: SHIPPED | OUTPATIENT
Start: 2019-08-21 | End: 2020-03-17 | Stop reason: SDUPTHER

## 2019-10-08 ENCOUNTER — OFFICE VISIT (OUTPATIENT)
Dept: CARDIOLOGY CLINIC | Age: 49
End: 2019-10-08

## 2019-10-08 VITALS
BODY MASS INDEX: 59.59 KG/M2 | SYSTOLIC BLOOD PRESSURE: 132 MMHG | WEIGHT: 315 LBS | HEART RATE: 112 BPM | DIASTOLIC BLOOD PRESSURE: 102 MMHG

## 2019-10-08 DIAGNOSIS — I50.42 CHRONIC COMBINED SYSTOLIC AND DIASTOLIC HEART FAILURE (HCC): Primary | ICD-10-CM

## 2019-10-08 DIAGNOSIS — I10 ESSENTIAL HYPERTENSION: ICD-10-CM

## 2019-10-08 DIAGNOSIS — I42.0 DILATED CARDIOMYOPATHY (HCC): ICD-10-CM

## 2019-10-08 PROCEDURE — 99214 OFFICE O/P EST MOD 30 MIN: CPT | Performed by: INTERNAL MEDICINE

## 2019-12-02 ENCOUNTER — APPOINTMENT (OUTPATIENT)
Dept: GENERAL RADIOLOGY | Age: 49
End: 2019-12-02

## 2019-12-02 ENCOUNTER — HOSPITAL ENCOUNTER (EMERGENCY)
Age: 49
Discharge: HOME | End: 2019-12-02
Attending: EMERGENCY MEDICINE

## 2019-12-02 VITALS
RESPIRATION RATE: 18 BRPM | HEART RATE: 92 BPM | HEIGHT: 72 IN | WEIGHT: 315 LBS | SYSTOLIC BLOOD PRESSURE: 135 MMHG | OXYGEN SATURATION: 93 % | TEMPERATURE: 98.1 F | DIASTOLIC BLOOD PRESSURE: 92 MMHG | BODY MASS INDEX: 42.66 KG/M2

## 2019-12-02 DIAGNOSIS — L03.116 CELLULITIS OF LEFT LOWER EXTREMITY: ICD-10-CM

## 2019-12-02 DIAGNOSIS — M79.672 LEFT FOOT PAIN: Primary | ICD-10-CM

## 2019-12-02 LAB
ANION GAP SERPL CALCULATED.3IONS-SCNC: 16 MMOL/L (ref 3–16)
BASOPHILS ABSOLUTE: 0.1 K/UL (ref 0–0.2)
BASOPHILS RELATIVE PERCENT: 0.7 %
BUN BLDV-MCNC: 28 MG/DL (ref 7–20)
CALCIUM SERPL-MCNC: 9.4 MG/DL (ref 8.3–10.6)
CHLORIDE BLD-SCNC: 96 MMOL/L (ref 99–110)
CO2: 23 MMOL/L (ref 21–32)
CREAT SERPL-MCNC: 1.2 MG/DL (ref 0.9–1.3)
EOSINOPHILS ABSOLUTE: 0.1 K/UL (ref 0–0.6)
EOSINOPHILS RELATIVE PERCENT: 1.1 %
GFR AFRICAN AMERICAN: >60
GFR NON-AFRICAN AMERICAN: >60
GLUCOSE BLD-MCNC: 314 MG/DL (ref 70–99)
HCT VFR BLD CALC: 37 % (ref 40.5–52.5)
HEMOGLOBIN: 12.1 G/DL (ref 13.5–17.5)
LYMPHOCYTES ABSOLUTE: 1.8 K/UL (ref 1–5.1)
LYMPHOCYTES RELATIVE PERCENT: 16.1 %
MCH RBC QN AUTO: 31.7 PG (ref 26–34)
MCHC RBC AUTO-ENTMCNC: 32.7 G/DL (ref 31–36)
MCV RBC AUTO: 97 FL (ref 80–100)
MONOCYTES ABSOLUTE: 0.8 K/UL (ref 0–1.3)
MONOCYTES RELATIVE PERCENT: 7 %
NEUTROPHILS ABSOLUTE: 8.4 K/UL (ref 1.7–7.7)
NEUTROPHILS RELATIVE PERCENT: 75.1 %
PDW BLD-RTO: 13.4 % (ref 12.4–15.4)
PLATELET # BLD: 233 K/UL (ref 135–450)
PMV BLD AUTO: 8.2 FL (ref 5–10.5)
POTASSIUM REFLEX MAGNESIUM: 4.5 MMOL/L (ref 3.5–5.1)
RBC # BLD: 3.82 M/UL (ref 4.2–5.9)
SODIUM BLD-SCNC: 135 MMOL/L (ref 136–145)
WBC # BLD: 11.2 K/UL (ref 4–11)

## 2019-12-02 PROCEDURE — 6360000002 HC RX W HCPCS: Performed by: EMERGENCY MEDICINE

## 2019-12-02 PROCEDURE — 73600 X-RAY EXAM OF ANKLE: CPT

## 2019-12-02 PROCEDURE — 85025 COMPLETE CBC W/AUTO DIFF WBC: CPT

## 2019-12-02 PROCEDURE — 99283 EMERGENCY DEPT VISIT LOW MDM: CPT

## 2019-12-02 PROCEDURE — 73630 X-RAY EXAM OF FOOT: CPT

## 2019-12-02 PROCEDURE — 6370000000 HC RX 637 (ALT 250 FOR IP): Performed by: EMERGENCY MEDICINE

## 2019-12-02 PROCEDURE — 96374 THER/PROPH/DIAG INJ IV PUSH: CPT

## 2019-12-02 PROCEDURE — 80048 BASIC METABOLIC PNL TOTAL CA: CPT

## 2019-12-02 RX ORDER — CEPHALEXIN 500 MG/1
500 CAPSULE ORAL ONCE
Status: COMPLETED | OUTPATIENT
Start: 2019-12-02 | End: 2019-12-02

## 2019-12-02 RX ORDER — SULFAMETHOXAZOLE AND TRIMETHOPRIM 800; 160 MG/1; MG/1
1 TABLET ORAL ONCE
Status: COMPLETED | OUTPATIENT
Start: 2019-12-02 | End: 2019-12-02

## 2019-12-02 RX ORDER — CEPHALEXIN 500 MG/1
500 CAPSULE ORAL 4 TIMES DAILY
Qty: 40 CAPSULE | Refills: 0 | Status: SHIPPED | OUTPATIENT
Start: 2019-12-02 | End: 2019-12-12

## 2019-12-02 RX ORDER — KETOROLAC TROMETHAMINE 30 MG/ML
15 INJECTION, SOLUTION INTRAMUSCULAR; INTRAVENOUS ONCE
Status: COMPLETED | OUTPATIENT
Start: 2019-12-02 | End: 2019-12-02

## 2019-12-02 RX ORDER — SULFAMETHOXAZOLE AND TRIMETHOPRIM 800; 160 MG/1; MG/1
1 TABLET ORAL 2 TIMES DAILY
Qty: 20 TABLET | Refills: 0 | Status: SHIPPED | OUTPATIENT
Start: 2019-12-02 | End: 2019-12-12

## 2019-12-02 RX ADMIN — CEPHALEXIN 500 MG: 500 CAPSULE ORAL at 14:07

## 2019-12-02 RX ADMIN — SULFAMETHOXAZOLE AND TRIMETHOPRIM 1 TABLET: 800; 160 TABLET ORAL at 14:07

## 2019-12-02 RX ADMIN — KETOROLAC TROMETHAMINE 15 MG: 30 INJECTION, SOLUTION INTRAMUSCULAR at 12:17

## 2019-12-02 ASSESSMENT — ENCOUNTER SYMPTOMS
WHEEZING: 0
SHORTNESS OF BREATH: 0
ABDOMINAL PAIN: 0
EYES NEGATIVE: 1
RHINORRHEA: 1
COLOR CHANGE: 1

## 2019-12-02 ASSESSMENT — PAIN DESCRIPTION - ORIENTATION: ORIENTATION: LEFT

## 2019-12-02 ASSESSMENT — PAIN DESCRIPTION - PAIN TYPE: TYPE: ACUTE PAIN

## 2019-12-02 ASSESSMENT — PAIN DESCRIPTION - FREQUENCY: FREQUENCY: CONTINUOUS

## 2019-12-02 ASSESSMENT — PAIN - FUNCTIONAL ASSESSMENT: PAIN_FUNCTIONAL_ASSESSMENT: PREVENTS OR INTERFERES SOME ACTIVE ACTIVITIES AND ADLS

## 2019-12-02 ASSESSMENT — PAIN DESCRIPTION - LOCATION: LOCATION: ANKLE;FOOT

## 2019-12-02 ASSESSMENT — PAIN DESCRIPTION - ONSET: ONSET: PROGRESSIVE

## 2019-12-02 ASSESSMENT — PAIN DESCRIPTION - PROGRESSION: CLINICAL_PROGRESSION: GRADUALLY WORSENING

## 2019-12-02 ASSESSMENT — PAIN DESCRIPTION - DESCRIPTORS: DESCRIPTORS: ACHING

## 2019-12-02 ASSESSMENT — PAIN SCALES - GENERAL
PAINLEVEL_OUTOF10: 8
PAINLEVEL_OUTOF10: 10

## 2019-12-10 ENCOUNTER — TELEPHONE (OUTPATIENT)
Dept: CARDIOLOGY CLINIC | Age: 49
End: 2019-12-10

## 2020-03-17 ENCOUNTER — OFFICE VISIT (OUTPATIENT)
Dept: CARDIOLOGY CLINIC | Age: 50
End: 2020-03-17

## 2020-03-17 VITALS
WEIGHT: 315 LBS | DIASTOLIC BLOOD PRESSURE: 94 MMHG | HEART RATE: 88 BPM | SYSTOLIC BLOOD PRESSURE: 144 MMHG | BODY MASS INDEX: 60.35 KG/M2

## 2020-03-17 DIAGNOSIS — I50.42 CHRONIC COMBINED SYSTOLIC AND DIASTOLIC HEART FAILURE (HCC): ICD-10-CM

## 2020-03-17 LAB
ANION GAP SERPL CALCULATED.3IONS-SCNC: 14 MMOL/L (ref 3–16)
BUN BLDV-MCNC: 21 MG/DL (ref 7–20)
CALCIUM SERPL-MCNC: 9.6 MG/DL (ref 8.3–10.6)
CHLORIDE BLD-SCNC: 102 MMOL/L (ref 99–110)
CO2: 21 MMOL/L (ref 21–32)
CREAT SERPL-MCNC: 1.1 MG/DL (ref 0.9–1.3)
GFR AFRICAN AMERICAN: >60
GFR NON-AFRICAN AMERICAN: >60
GLUCOSE BLD-MCNC: 253 MG/DL (ref 70–99)
POTASSIUM SERPL-SCNC: 4.5 MMOL/L (ref 3.5–5.1)
PRO-BNP: 101 PG/ML (ref 0–124)
SODIUM BLD-SCNC: 137 MMOL/L (ref 136–145)

## 2020-03-17 PROCEDURE — 99214 OFFICE O/P EST MOD 30 MIN: CPT | Performed by: INTERNAL MEDICINE

## 2020-03-17 RX ORDER — METOPROLOL SUCCINATE 100 MG/1
100 TABLET, EXTENDED RELEASE ORAL DAILY
Qty: 90 TABLET | Refills: 3 | Status: SHIPPED | OUTPATIENT
Start: 2020-03-17 | End: 2021-05-26

## 2020-03-17 RX ORDER — VALSARTAN 320 MG/1
320 TABLET ORAL DAILY
Qty: 90 TABLET | Refills: 3 | Status: SHIPPED | OUTPATIENT
Start: 2020-03-17 | End: 2020-09-16 | Stop reason: ALTCHOICE

## 2020-03-17 RX ORDER — HYDROCHLOROTHIAZIDE 25 MG/1
25 TABLET ORAL DAILY
Qty: 90 TABLET | Refills: 3 | Status: SHIPPED | OUTPATIENT
Start: 2020-03-17 | End: 2021-07-13 | Stop reason: SDUPTHER

## 2020-03-17 RX ORDER — SPIRONOLACTONE 25 MG/1
25 TABLET ORAL DAILY
Qty: 90 TABLET | Refills: 3 | Status: SHIPPED | OUTPATIENT
Start: 2020-03-17 | End: 2021-05-26

## 2020-03-17 RX ORDER — TORSEMIDE 100 MG/1
100 TABLET ORAL DAILY
Qty: 90 TABLET | Refills: 3 | Status: SHIPPED
Start: 2020-03-17 | End: 2020-07-21 | Stop reason: SINTOL

## 2020-03-17 NOTE — PROGRESS NOTES
Cc: HFrEF    HPI:     Mr Julito Collins is a 52 yo man with extreme morbid obesity (BMI 60, 439 lbs), DM, HTN, HFrEF (EF 20-25%), poor compliance with meds.      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 He lives alone. His shift has changed to 12 MN to 8 am. HF meds have been adjusted to once daily to accommodate his schedule and preferences for compliance. He used to follow with Dr. Huma Santillan, but wanted to consolidate his cardiac care with me and Ania Stoner. He denies any smoking, alcohol, drugs.      Echo 10/25/18: mild LV dilatation, EF 20-25%, RV is dilated and dysfunctional, RVSP 52, no valve abnormalities.      Echo 03/2019: LVEF 40-45%     Coronary calcium score unable to obtain due to extreme obesity and granular images.      Patient returns for a follow up. He missed prior appointments but came today because his leg swelling has been getting worse despite taking lasix 6 tabs of 40 mg daily the last few weeks. He takes the HCTZ infrequently. He denies any other symptoms. Histories     Past Medical History:   has a past medical history of CHF (congestive heart failure) ([address] Diabetes mellitus ([address] HLD (hyperlipidemia), and Hypertension. Surgical History:   has a past surgical history that includes knee surgery. Social History:   reports that he has quit smoking. His smoking use included cigars. He has never used smokeless tobacco. He reports current alcohol use. He reports that he does not use drugs. Family History:  No evidence for sudden cardiac death or premature CAD      Medications:     Home medications were reviewed and are listed below    Prior to Admission medications    Medication Sig Start Date End Date Taking?  Authorizing Provider   spironolactone (ALDACTONE) 25 MG tablet TAKE ONE TABLET BY MOUTH DAILY 2/28/20  Yes DEBBIE Álvarez - CNP hydrochlorothiazide (HYDRODIURIL) 25 MG tablet Take 1 tablet by mouth daily 8/21/19  Yes Brennan Maker, APRN - CNP   furosemide (LASIX) 40 MG tablet Take 2 tablets by mouth daily 8/7/19  Yes Brennan Maker, APRN - CNP   valsartan (DIOVAN) 320 MG tablet Take 1 tablet by mouth daily 8/7/19  Yes Brennan Maker, APRN - CNP   metoprolol succinate (TOPROL XL) 100 MG extended release tablet Take 1 tablet by mouth daily At bedtime 8/7/19  Yes Brennan Maker, APRN - CNP          Allergy:     Lisinopril       Review of Systems:     All 12 point review of symptoms completed. Pertinent positives identified in the HPI, all other review of symptoms negative as below. CONSTITUTIONAL: No fatigue  SKIN: No rash or pruritis. EYES: No visual changes or diplopia. No scleral icterus. ENT: No Headaches, hearing loss or vertigo. No mouth sores or sore throat. CARDIOVASCULAR: No chest pain/chest pressure/chest discomfort. No palpitations. No edema. RESPIRATORY: No dyspnea. No cough or wheezing, no sputum production. GASTROINTESTINAL: No N/V/D. No abdominal pain, appetite loss, blood in stools. GENITOURINARY: No dysuria, trouble voiding, or hematuria. MUSCULOSKELETAL:  No gait disturbance, weakness or joint complaints. NEUROLOGICAL: No headache, diplopia, change in muscle strength, numbness or tingling. No change in gait, balance, coordination, mood, affect, memory, mentation, behavior. PSHYCH: No anxiety, loss of interest, change in sexual behavior, feelings of self-harm, or confusion. ENDOCRINE: No excessive thirst, fluid intake, or urination. No tremor. HEMATOLOGIC: No abnormal bruising or bleeding. ALLERGY: No nasal congestion or hives.       Physical Examination:     Vitals:    03/17/20 0947   BP: (!) 144/94   Pulse: 88   Weight: (!) 445 lb (201.9 kg)       Wt Readings from Last 3 Encounters:   03/17/20 (!) 445 lb (201.9 kg)   12/02/19 (!) 430 lb (195 kg)   10/08/19 (!) 439 lb 6.4 oz (199.3 kg)         General Appearance:  Alert, cooperative, no distress, appears stated age Appropriate weight   Head:  Normocephalic, without obvious abnormality, atraumatic   Eyes:  PERRL, conjunctiva/corneas clear EOM intact  Ears normal   Throat no lesions       Nose: Nares normal, no drainage or sinus tenderness   Throat: Lips, mucosa, and tongue normal   Neck: Supple, symmetrical, trachea midline, no adenopathy, thyroid: not enlarged, symmetric, no tenderness/mass/nodules, no carotid bruit       Lungs:   Clear to auscultation bilaterally, respirations unlabored   Chest Wall:  No tenderness or deformity   Heart:  Regular rhythm, rate is controlled, S1, S2 normal, there is no murmur, there is no rub or gallop, no jvd, 1+ bilateral lower extremity edema   Abdomen:   Soft, non-tender, bowel sounds active all four quadrants,  no masses, no organomegaly       Extremities: Extremities normal, atraumatic, no cyanosis   Pulses: 2+ and symmetric   Skin: Skin color, texture, turgor normal, no rashes or lesions   Pysch: Normal mood and affect   Neurologic: Normal gross motor and sensory exam.  Cranial nerves intact        Labs:     Lab Results   Component Value Date    WBC 11.2 (H) 12/02/2019    HGB 12.1 (L) 12/02/2019    HCT 37.0 (L) 12/02/2019    MCV 97.0 12/02/2019     12/02/2019     Lab Results   Component Value Date     (L) 12/02/2019    K 4.5 12/02/2019    CL 96 (L) 12/02/2019    CO2 23 12/02/2019    BUN 28 (H) 12/02/2019    CREATININE 1.2 12/02/2019    GLUCOSE 314 (H) 12/02/2019    CALCIUM 9.4 12/02/2019    PROT 6.9 10/24/2018    LABALBU 4.1 10/24/2018    BILITOT 0.3 10/24/2018    ALKPHOS 85 10/24/2018    AST 28 10/24/2018    ALT 20 10/24/2018    LABGLOM >60 12/02/2019    GFRAA >60 12/02/2019    AGRATIO 1.5 10/24/2018    GLOB 2.8 10/24/2018         Lab Results   Component Value Date    CHOL 244 (H) 10/25/2018    CHOL 180 11/01/2012     Lab Results   Component Value Date    TRIG 238 (H) 10/25/2018    TRIG 156 (H) 11/01/2012 Lab Results   Component Value Date    HDL 43 10/25/2018    HDL 39 11/01/2012     Lab Results   Component Value Date    LDLCALC 153 (H) 10/25/2018    LDLCALC 110 11/01/2012     Lab Results   Component Value Date    LABVLDL 48 10/25/2018     No results found for: [facility]    Lab Results   Component Value Date    INR 1.02 12/01/2009    PROTIME 11.4 12/01/2009       The 10-year ASCVD risk score (Raghav Domínguez, et al., 2013) is: 11.5%    Values used to calculate the score:      Age: 53 years      Sex: Male      Is Non- : Yes      Diabetic: No      Tobacco smoker: No      Systolic Blood Pressure: 175 mmHg      Is BP treated: Yes      HDL Cholesterol: 43 mg/dL      Total Cholesterol: 244 mg/dL      Assessment / Plan:      Diagnosis Orders   1. Dilated cardiomyopathy ([facility]     2. Chronic combined systolic and diastolic heart failure ([facility] [facility]     3. Essential hypertension          1. HFrEF:   It is likely nonischemic in origin (severe morbid obesity, likely untreated CARLITOS, uncontrolled HTN due to poor compliance with meds). I cannot exclude ischemia though. His NYHA FC is II, stage C, appears mildly volume overloaded and hemo stable.     -Change lasix to torsemide 100 daily, continue with HCTZ 25 and rocael 25 daily  -C/w Toprol 100 daily and valsartan 320 daily  -Check a BMP and BNP now and in 2 weeks.   -Will defer ischemic evaluation (LHC) and genetic testing in view of improving LVEF    2. Essential HTN:   Mildly elevated BP, will monitor. Return in about 4 months (around 7/17/2020). I have spent 25 minutes of face to face time with the patient with more than 50% spent counseling and coordinating care. I have personally reviewed the reports and images of labs, radiological studies, cardiac studies including ECG's and telemetry, current and old medical records. The note was completed using EMR and Dragon dictation system.  Every effort was made to ensure accuracy; however,

## 2020-04-03 ENCOUNTER — TELEPHONE (OUTPATIENT)
Dept: CARDIOLOGY CLINIC | Age: 50
End: 2020-04-03

## 2020-04-03 RX ORDER — SILDENAFIL 25 MG/1
25 TABLET, FILM COATED ORAL DAILY PRN
Qty: 30 TABLET | Refills: 1 | Status: SHIPPED | OUTPATIENT
Start: 2020-04-03 | End: 2021-05-26

## 2020-04-03 NOTE — TELEPHONE ENCOUNTER
Pt calling to see if he could have a prescription for Viagra. Pt explain every since he has had heart issues he has had \"manly problems\". Lenny Whalen NP is ok with pt taking Viagra and will call in a prescription. Pt verbalized understanding.

## 2020-04-06 DIAGNOSIS — I50.42 CHRONIC COMBINED SYSTOLIC AND DIASTOLIC HEART FAILURE (HCC): ICD-10-CM

## 2020-04-06 LAB
ANION GAP SERPL CALCULATED.3IONS-SCNC: 18 MMOL/L (ref 3–16)
BUN BLDV-MCNC: 81 MG/DL (ref 7–20)
CALCIUM SERPL-MCNC: 10.5 MG/DL (ref 8.3–10.6)
CHLORIDE BLD-SCNC: 87 MMOL/L (ref 99–110)
CO2: 24 MMOL/L (ref 21–32)
CREAT SERPL-MCNC: 1.9 MG/DL (ref 0.9–1.3)
GFR AFRICAN AMERICAN: 46
GFR NON-AFRICAN AMERICAN: 38
GLUCOSE BLD-MCNC: 561 MG/DL (ref 70–99)
POTASSIUM SERPL-SCNC: 4.9 MMOL/L (ref 3.5–5.1)
PRO-BNP: 45 PG/ML (ref 0–124)
SODIUM BLD-SCNC: 129 MMOL/L (ref 136–145)

## 2020-04-07 ENCOUNTER — HOSPITAL ENCOUNTER (EMERGENCY)
Age: 50
Discharge: HOME | End: 2020-04-07
Attending: EMERGENCY MEDICINE

## 2020-04-07 ENCOUNTER — TELEPHONE (OUTPATIENT)
Dept: CARDIOLOGY CLINIC | Age: 50
End: 2020-04-07

## 2020-04-07 VITALS
SYSTOLIC BLOOD PRESSURE: 163 MMHG | RESPIRATION RATE: 18 BRPM | TEMPERATURE: 98.1 F | BODY MASS INDEX: 42.66 KG/M2 | DIASTOLIC BLOOD PRESSURE: 64 MMHG | WEIGHT: 315 LBS | OXYGEN SATURATION: 98 % | HEIGHT: 72 IN | HEART RATE: 104 BPM

## 2020-04-07 LAB
ANION GAP SERPL CALCULATED.3IONS-SCNC: 20 MMOL/L (ref 3–16)
BASE EXCESS VENOUS: -1.7 MMOL/L (ref -2–3)
BASOPHILS ABSOLUTE: 0.1 K/UL (ref 0–0.2)
BASOPHILS RELATIVE PERCENT: 1.2 %
BETA-HYDROXYBUTYRATE: 0.23 MMOL/L (ref 0–0.27)
BILIRUBIN URINE: NEGATIVE
BLOOD, URINE: NEGATIVE
BUN BLDV-MCNC: 74 MG/DL (ref 7–20)
CALCIUM SERPL-MCNC: 10.1 MG/DL (ref 8.3–10.6)
CARBOXYHEMOGLOBIN: 2.5 % (ref 0–1.5)
CHLORIDE BLD-SCNC: 88 MMOL/L (ref 99–110)
CLARITY: CLEAR
CO2: 22 MMOL/L (ref 21–32)
COLOR: YELLOW
CREAT SERPL-MCNC: 1.8 MG/DL (ref 0.9–1.3)
EOSINOPHILS ABSOLUTE: 0.1 K/UL (ref 0–0.6)
EOSINOPHILS RELATIVE PERCENT: 1.1 %
GFR AFRICAN AMERICAN: 49
GFR NON-AFRICAN AMERICAN: 40
GLUCOSE BLD-MCNC: 475 MG/DL (ref 70–99)
GLUCOSE BLD-MCNC: 518 MG/DL (ref 70–99)
GLUCOSE URINE: >=1000 MG/DL
HCO3 VENOUS: 21.9 MMOL/L (ref 24–28)
HCT VFR BLD CALC: 35.6 % (ref 40.5–52.5)
HEMOGLOBIN, VEN, REDUCED: 1.7 %
HEMOGLOBIN: 12 G/DL (ref 13.5–17.5)
KETONES, URINE: NEGATIVE MG/DL
LEUKOCYTE ESTERASE, URINE: NEGATIVE
LYMPHOCYTES ABSOLUTE: 1.9 K/UL (ref 1–5.1)
LYMPHOCYTES RELATIVE PERCENT: 19.8 %
MCH RBC QN AUTO: 31.6 PG (ref 26–34)
MCHC RBC AUTO-ENTMCNC: 33.6 G/DL (ref 31–36)
MCV RBC AUTO: 93.8 FL (ref 80–100)
METHEMOGLOBIN VENOUS: 0.6 % (ref 0–1.5)
MICROSCOPIC EXAMINATION: ABNORMAL
MONOCYTES ABSOLUTE: 0.7 K/UL (ref 0–1.3)
MONOCYTES RELATIVE PERCENT: 7.4 %
NEUTROPHILS ABSOLUTE: 6.6 K/UL (ref 1.7–7.7)
NEUTROPHILS RELATIVE PERCENT: 70.5 %
NITRITE, URINE: NEGATIVE
O2 SAT, VEN: 98 %
PCO2, VEN: 35.4 MMHG (ref 41–51)
PDW BLD-RTO: 13.3 % (ref 12.4–15.4)
PERFORMED ON: ABNORMAL
PH UA: 6 (ref 5–8)
PH VENOUS: 7.41 (ref 7.35–7.45)
PLATELET # BLD: 249 K/UL (ref 135–450)
PMV BLD AUTO: 8.8 FL (ref 5–10.5)
PO2, VEN: 91.8 MMHG (ref 25–40)
POTASSIUM REFLEX MAGNESIUM: 5 MMOL/L (ref 3.5–5.1)
PROTEIN UA: NEGATIVE MG/DL
RBC # BLD: 3.8 M/UL (ref 4.2–5.9)
SODIUM BLD-SCNC: 130 MMOL/L (ref 136–145)
SPECIFIC GRAVITY UA: 1.01 (ref 1–1.03)
TCO2 CALC VENOUS: 23 MMOL/L
URINE TYPE: ABNORMAL
UROBILINOGEN, URINE: 0.2 E.U./DL
WBC # BLD: 9.4 K/UL (ref 4–11)

## 2020-04-07 PROCEDURE — 81003 URINALYSIS AUTO W/O SCOPE: CPT

## 2020-04-07 PROCEDURE — 82803 BLOOD GASES ANY COMBINATION: CPT

## 2020-04-07 PROCEDURE — 80048 BASIC METABOLIC PNL TOTAL CA: CPT

## 2020-04-07 PROCEDURE — 2580000003 HC RX 258: Performed by: PHYSICIAN ASSISTANT

## 2020-04-07 PROCEDURE — 82010 KETONE BODYS QUAN: CPT

## 2020-04-07 PROCEDURE — 85025 COMPLETE CBC W/AUTO DIFF WBC: CPT

## 2020-04-07 PROCEDURE — 83036 HEMOGLOBIN GLYCOSYLATED A1C: CPT

## 2020-04-07 PROCEDURE — 99284 EMERGENCY DEPT VISIT MOD MDM: CPT

## 2020-04-07 RX ORDER — 0.9 % SODIUM CHLORIDE 0.9 %
500 INTRAVENOUS SOLUTION INTRAVENOUS ONCE
Status: COMPLETED | OUTPATIENT
Start: 2020-04-07 | End: 2020-04-07

## 2020-04-07 RX ADMIN — SODIUM CHLORIDE 500 ML: 9 INJECTION, SOLUTION INTRAVENOUS at 18:47

## 2020-04-07 ASSESSMENT — ENCOUNTER SYMPTOMS
ABDOMINAL PAIN: 0
CHEST TIGHTNESS: 0
DIARRHEA: 0
WHEEZING: 0
VOMITING: 0
SHORTNESS OF BREATH: 0
COUGH: 0
NAUSEA: 0

## 2020-04-07 NOTE — TELEPHONE ENCOUNTER
Decrease torsemide to 50mg/day for elevated BUN/Cr. Bigger issue is his BG at 561 - he has no PCP and most likely undx dm. I hate to send him to the ER for that but i'm not sure what other avenue we have.  Yevgeniy Fernández

## 2020-04-07 NOTE — ED PROVIDER NOTES
ED Attending Attestation Note     Date of evaluation: 4/7/2020    This patient was seen by the NEO. I have seen and examined the patient, agree with the workup, evaluation, management and diagnosis. The care plan has been discussed. I was present for any procedures performed in the NEO's note and have made edits to the note where appropriate. My assessment reveals 53 y.o. male with history of dilated cardiomyopathy, systolic/diastolic heart failure, super morbid obesity, possible diabetes not on medications presenting for hyperglycemia. Was noted on routine labs yesterday to have blood sugars in the 500s. He has never been on medications for diabetes and denies a personal history of such. He in fact denies that these blood sugars represent that he has diabetes, stating he is related to his recent poor diet over the weekend. He is alert, appropriately interactive, moving all extremities spontaneously, no tachycardia, no tachypnea, no increased work of breathing or Kussmaul breathing. Recommended that the patient stay for further evaluation and medical management of his diabetes as outpatient management is difficult at this time due to the current COVID pandemic. He declined admission to the hospital. The patient ultimately elected to leave against medical advice. They had decision-making capacity and were free from intrinsic or extrinsic coercive factors when making their decision. They understood that the risks of leaving including but not limited to confusion, loss of consciousness, pain and suffering, permanent disability, and death. They were able to discuss the risks of their decision with me in detail, and all questions were answered to their satisfaction. They were informed that they were welcome to return to the emergency room at any time if they changed their mind or if symptoms worsened.   Patient will be initiated on metformin and attempt to manage his medical problems as much as possible in

## 2020-04-07 NOTE — ED TRIAGE NOTES
Pt here today for high glucose. Pt was told to come here d/t blood sugar on labs yesterday at 561. Pt states he feels fine.

## 2020-04-08 ENCOUNTER — CARE COORDINATION (OUTPATIENT)
Dept: CARE COORDINATION | Age: 50
End: 2020-04-08

## 2020-04-08 LAB
ESTIMATED AVERAGE GLUCOSE: 277.6 MG/DL
HBA1C MFR BLD: 11.3 %

## 2020-04-09 ENCOUNTER — VIRTUAL VISIT (OUTPATIENT)
Dept: FAMILY MEDICINE CLINIC | Age: 50
End: 2020-04-09

## 2020-04-09 VITALS — WEIGHT: 315 LBS | HEIGHT: 72 IN | BODY MASS INDEX: 42.66 KG/M2

## 2020-04-09 PROCEDURE — 99203 OFFICE O/P NEW LOW 30 MIN: CPT | Performed by: FAMILY MEDICINE

## 2020-04-09 RX ORDER — BLOOD-GLUCOSE METER
1 KIT MISCELLANEOUS DAILY
Qty: 1 KIT | Refills: 0 | Status: SHIPPED | OUTPATIENT
Start: 2020-04-09 | End: 2022-09-27

## 2020-04-09 RX ORDER — LANCETS 30 GAUGE
EACH MISCELLANEOUS
Qty: 100 EACH | Refills: 2 | Status: SHIPPED | OUTPATIENT
Start: 2020-04-09 | End: 2022-09-27

## 2020-04-09 RX ORDER — GLUCOSAMINE HCL/CHONDROITIN SU 500-400 MG
CAPSULE ORAL
Qty: 100 STRIP | Refills: 1 | Status: SHIPPED | OUTPATIENT
Start: 2020-04-09 | End: 2022-09-27

## 2020-04-09 SDOH — ECONOMIC STABILITY: INCOME INSECURITY: HOW HARD IS IT FOR YOU TO PAY FOR THE VERY BASICS LIKE FOOD, HOUSING, MEDICAL CARE, AND HEATING?: NOT HARD AT ALL

## 2020-04-09 SDOH — ECONOMIC STABILITY: TRANSPORTATION INSECURITY
IN THE PAST 12 MONTHS, HAS LACK OF TRANSPORTATION KEPT YOU FROM MEETINGS, WORK, OR FROM GETTING THINGS NEEDED FOR DAILY LIVING?: NO

## 2020-04-09 SDOH — ECONOMIC STABILITY: FOOD INSECURITY: WITHIN THE PAST 12 MONTHS, THE FOOD YOU BOUGHT JUST DIDN'T LAST AND YOU DIDN'T HAVE MONEY TO GET MORE.: NEVER TRUE

## 2020-04-09 SDOH — ECONOMIC STABILITY: FOOD INSECURITY: WITHIN THE PAST 12 MONTHS, YOU WORRIED THAT YOUR FOOD WOULD RUN OUT BEFORE YOU GOT MONEY TO BUY MORE.: NEVER TRUE

## 2020-04-09 SDOH — ECONOMIC STABILITY: TRANSPORTATION INSECURITY
IN THE PAST 12 MONTHS, HAS THE LACK OF TRANSPORTATION KEPT YOU FROM MEDICAL APPOINTMENTS OR FROM GETTING MEDICATIONS?: NO

## 2020-04-09 ASSESSMENT — ENCOUNTER SYMPTOMS
COUGH: 0
COLOR CHANGE: 0
STRIDOR: 0
CHOKING: 0
SHORTNESS OF BREATH: 1
BLOOD IN STOOL: 0
VOMITING: 0
NAUSEA: 1
BACK PAIN: 0
ABDOMINAL PAIN: 0
CHEST TIGHTNESS: 0
CONSTIPATION: 0
DIARRHEA: 0
WHEEZING: 0
PHOTOPHOBIA: 0

## 2020-04-09 ASSESSMENT — PATIENT HEALTH QUESTIONNAIRE - PHQ9
SUM OF ALL RESPONSES TO PHQ QUESTIONS 1-9: 0
2. FEELING DOWN, DEPRESSED OR HOPELESS: 0
SUM OF ALL RESPONSES TO PHQ QUESTIONS 1-9: 0
SUM OF ALL RESPONSES TO PHQ9 QUESTIONS 1 & 2: 0
1. LITTLE INTEREST OR PLEASURE IN DOING THINGS: 0

## 2020-04-10 ENCOUNTER — TELEPHONE (OUTPATIENT)
Dept: FAMILY MEDICINE CLINIC | Age: 50
End: 2020-04-10

## 2020-04-10 ENCOUNTER — TELEPHONE (OUTPATIENT)
Dept: CARDIOLOGY CLINIC | Age: 50
End: 2020-04-10

## 2020-04-14 ENCOUNTER — CARE COORDINATION (OUTPATIENT)
Dept: CARE COORDINATION | Age: 50
End: 2020-04-14

## 2020-04-20 ENCOUNTER — TELEPHONE (OUTPATIENT)
Dept: FAMILY MEDICINE CLINIC | Age: 50
End: 2020-04-20

## 2020-04-20 NOTE — LETTER
[address] [address] [facility]  Phone: [phone]  Fax: [phone]    Bridget Comer MD        April 10, 2020     Patient: Mattie Goldberg   YOB: 1970   Date of Visit: 4/21/2020       To Whom It May Concern:     Mattie Goldberg is a patient currently under my care and I had visit with him on 4/9/20 as well as follow-up via telephone today 4/21/20. As his primary care provider,  I oversee his chronic medication conditions which include diabetes. He is currently under treatment for this with medication, but as a result of his condition has complications with neuropathy and pain in his feet. This intermittently has flare-ups that cause him severe pain and prevent him from being able to work during those times due to trouble standing, weight-bearing and walking due to pain. We are starting him on new medication to help with this and will be monitoring how he progresses with treatment over the next few weeks to months. If you have any questions or concerns, please don't hesitate to call. Sincerely,          Herrera Celestin.  Kat Chavez, [address] Medicine  4/21/2020

## 2020-04-20 NOTE — TELEPHONE ENCOUNTER
Patient called to speak with Dr Flori Guthrie concerning his feet. Since he was recently diagnosed with diabetes - his feet have started to bother him. The right one is swelling and painful to walk. The left foot seems to doing the same but mostly on the top of the foot. No temperature, no headache, no coughing. He is a heart patient. I tried to transfer to Triage Nurse but patient didn't want to \" tell the story 10 more times\" and just wanted  To know and what he suggests for treatment.     Please call him

## 2020-04-21 ENCOUNTER — NURSE TRIAGE (OUTPATIENT)
Dept: OTHER | Facility: CLINIC | Age: 50
End: 2020-04-21

## 2020-04-21 RX ORDER — GABAPENTIN 100 MG/1
100 CAPSULE ORAL 3 TIMES DAILY
Qty: 90 CAPSULE | Refills: 0 | Status: SHIPPED | OUTPATIENT
Start: 2020-04-21 | End: 2020-06-01 | Stop reason: SDUPTHER

## 2020-04-21 NOTE — TELEPHONE ENCOUNTER
Sounds like he could be developing some neuropathy related to his diabetes. Does not sound concerning for cellulitis and no wounds present. Start trial of gabapentin which should help with his nerve pain. Call back if not improving or any medication side effects.

## 2020-04-21 NOTE — TELEPHONE ENCOUNTER
Answer Assessment - Initial Assessment Questions  1. ONSET: \"When did the pain start? \"       \"for awhile\" but the past 4-5 days has increased in pain and numbness. Bilateral feet but R>L  2. LOCATION: \"Where is the pain located? \"       Feet/ankles  3. PAIN: \"How bad is the pain? \"    (Scale 1-10; or mild, moderate, severe)    -  MILD (1-3): doesn't interfere with normal activities     -  MODERATE (4-7): interferes with normal activities (e.g., work or school) or awakens from sleep, limping     -  SEVERE (8-10): excruciating pain, unable to do any normal activities, unable to walk        4. WORK OR EXERCISE: \"Has there been any recent work or exercise that involved this part of the body? \"       no  5. CAUSE: \"What do you think is causing the foot pain? \"      unknown  6. OTHER SYMPTOMS: \"Do you have any other symptoms? \" (e.g., leg pain, rash, fever, numbness)      numbness  7. PREGNANCY: \"Is there any chance you are pregnant? \" \"When was your last menstrual period? \"      n/a    Protocols used: FOOT PAIN-ADULT-[place]    Patient called to report worsening bilateral foot pain and numbness. Right worse than left. He states that a couple days ago that the right foot/ankle was swollen but he took ibuprofen and the swelling is no longer there. Patient states that this pain and numbness has been happening for \"awhile\". Patient is a diabetic. When reviewing the chart, it was noted that the patient had called in yesterday with similar complaint and was waiting on response. I did tell him that the message yesterday was sent close to 5pm and that it was routed to the provider this morning. He was agreeable to wait for a response. Will not route another message since his complaint has already been sent.

## 2020-04-23 ENCOUNTER — CARE COORDINATION (OUTPATIENT)
Dept: CARE COORDINATION | Age: 50
End: 2020-04-23

## 2020-05-15 RX ORDER — INDOMETHACIN 50 MG/1
50 CAPSULE ORAL 3 TIMES DAILY
Qty: 30 CAPSULE | Refills: 0 | Status: SHIPPED | OUTPATIENT
Start: 2020-05-15 | End: 2020-06-01 | Stop reason: SDUPTHER

## 2020-05-15 RX ORDER — COLCHICINE 0.6 MG/1
0.6 TABLET ORAL DAILY
Qty: 10 TABLET | Refills: 0 | Status: SHIPPED | OUTPATIENT
Start: 2020-05-15 | End: 2020-06-01 | Stop reason: SDUPTHER

## 2020-06-01 ENCOUNTER — TELEPHONE (OUTPATIENT)
Dept: FAMILY MEDICINE CLINIC | Age: 50
End: 2020-06-01

## 2020-06-01 RX ORDER — INDOMETHACIN 50 MG/1
50 CAPSULE ORAL 3 TIMES DAILY
Qty: 30 CAPSULE | Refills: 1 | Status: SHIPPED | OUTPATIENT
Start: 2020-06-01 | End: 2020-07-18 | Stop reason: SDUPTHER

## 2020-06-01 RX ORDER — GABAPENTIN 300 MG/1
300 CAPSULE ORAL 3 TIMES DAILY
Qty: 90 CAPSULE | Refills: 0 | Status: SHIPPED | OUTPATIENT
Start: 2020-06-01 | End: 2020-09-16 | Stop reason: ALTCHOICE

## 2020-06-01 RX ORDER — COLCHICINE 0.6 MG/1
0.6 TABLET ORAL DAILY
Qty: 10 TABLET | Refills: 1 | Status: SHIPPED | OUTPATIENT
Start: 2020-06-01 | End: 2021-05-26

## 2020-07-18 RX ORDER — ALLOPURINOL 100 MG/1
100 TABLET ORAL DAILY
Qty: 90 TABLET | Refills: 1 | Status: SHIPPED | OUTPATIENT
Start: 2020-07-18 | End: 2020-10-12 | Stop reason: SDUPTHER

## 2020-07-18 RX ORDER — INDOMETHACIN 50 MG/1
50 CAPSULE ORAL 3 TIMES DAILY
Qty: 30 CAPSULE | Refills: 1 | Status: SHIPPED | OUTPATIENT
Start: 2020-07-18 | End: 2020-07-18

## 2020-07-18 RX ORDER — INDOMETHACIN 50 MG/1
CAPSULE ORAL
Qty: 30 CAPSULE | Refills: 0 | Status: SHIPPED | OUTPATIENT
Start: 2020-07-18 | End: 2020-10-12 | Stop reason: SDUPTHER

## 2020-07-18 RX ORDER — INDOMETHACIN 50 MG/1
50 CAPSULE ORAL 3 TIMES DAILY
Qty: 60 CAPSULE | Refills: 3 | Status: SHIPPED | OUTPATIENT
Start: 2020-07-18 | End: 2020-07-18

## 2020-07-18 NOTE — PROGRESS NOTES
Patient complains of significant increased pain in left foot. He said this is very much like his gout flare that started last month. He had good relief with the indomethacin but it is since worn off and he is unable to walk. Patient reports he tried colchicine but that was not as effective as the indomethacin. He is uninsured and unable to continue on any expensive medication. Chart review indicates no uric acid has been drawn in addition to an elevated BUN and creatinine in April. This was thought to be due to his over diuresing and undiagnosed diabetes. He has not had any labs rechecked since that time. We will send in for him to restart the indomethacin in addition to allopurinol. I have put in for labs to be drawn to check renal as well as uric acid. I have instructed him he must stay well-hydrated within the limitations given to him by cardiology. I suspect the pain he is having in his right foot in addition to some pain in his left foot is likely neuropathy from his uncontrolled diabetes. He will touch base with me next week for follow-up. I have informed him he needs to get these labs done if he is to continue on these medications as the indomethacin can be negatively affecting his kidneys.

## 2020-07-21 ENCOUNTER — VIRTUAL VISIT (OUTPATIENT)
Dept: FAMILY MEDICINE CLINIC | Age: 50
End: 2020-07-21

## 2020-07-21 PROBLEM — M10.9 ACUTE GOUT OF LEFT FOOT: Status: ACTIVE | Noted: 2020-07-21

## 2020-07-21 PROCEDURE — 99214 OFFICE O/P EST MOD 30 MIN: CPT | Performed by: NURSE PRACTITIONER

## 2020-07-21 ASSESSMENT — PATIENT HEALTH QUESTIONNAIRE - PHQ9
SUM OF ALL RESPONSES TO PHQ QUESTIONS 1-9: 0
1. LITTLE INTEREST OR PLEASURE IN DOING THINGS: 0
SUM OF ALL RESPONSES TO PHQ9 QUESTIONS 1 & 2: 0
SUM OF ALL RESPONSES TO PHQ QUESTIONS 1-9: 0
2. FEELING DOWN, DEPRESSED OR HOPELESS: 0

## 2020-07-21 ASSESSMENT — ENCOUNTER SYMPTOMS
GASTROINTESTINAL NEGATIVE: 1
SHORTNESS OF BREATH: 1
ALLERGIC/IMMUNOLOGIC NEGATIVE: 1

## 2020-07-21 NOTE — ASSESSMENT & PLAN NOTE
Pain is resolved at this time. He will need to get labs drawn. He is to stop the Indocin and only take when he has flares.   He understands the negative effect on his kidneys of this

## 2020-07-21 NOTE — PROGRESS NOTES
2020    TELEHEALTH EVALUATION -- Audio/Visual (During USYTS-27 public health emergency)    HPI:    Gracie Gonsalez (:  1970) has requested an audio/video evaluation for the following concern(s):  Presents for follow-up on gout in his left foot. Reports that since starting the indomethacin and the allopurinol he has had near complete relief of pain. He is going to stop taking the indomethacin. Per past chart review:  He has been seeing cardiologist, Dr Mikaela Workman  History of dilated cardiomyopathy  Condition is stable and EF improving with medicaiton  He takes metoprolol, losartan, aldactone, he was taken off of the torsemide due to renal failure which appears to be improving  Denies swelling in his legs  Has shortness of breath with exertion -- at baseline and unchanged  Has noted elevated blood sugars and A1c greater than 9, he has a blood sugar machine at home however he is not checking his sugars. He is only taking the metformin twice daily. So far is tolerating metformin well  Symptoms started a few weeks ago but he thought it was related to his heart condition  Denies being told in the past that he has diabetes but strong family history of this     Works at the [email or web address] --  -- currently 3rd shift but wants to switch to regular hours to make medications easier. Started this job about 3 months ago and has been having some complications with his health since that time. Difficulty to be compliant with his meds and diet is worse when on 3rd shift    Review of Systems   Constitutional: Negative. HENT: Negative. Respiratory: Positive for shortness of breath. Chronic and stable   Cardiovascular: Negative. Gastrointestinal: Negative. Endocrine: Negative. Genitourinary: Negative. Musculoskeletal:        Gout pain is resolved   Allergic/Immunologic: Negative. Neurological: Negative. Hematological: Negative. Psychiatric/Behavioral: Negative.         Prior to Visit Medications    Medication Sig Taking? Authorizing Provider   indomethacin (INDOCIN) 50 MG capsule TAKE ONE CAPSULE BY MOUTH THREE TIMES A DAY Yes DEBBIE Parra - CNP   allopurinol (ZYLOPRIM) 100 MG tablet Take 1 tablet by mouth daily Yes DEBBIE Barragan - CNP   metFORMIN (GLUCOPHAGE) 500 MG tablet Take 1 tablet by mouth 2 times daily (with meals) Yes Gagan Lou MD   glucose monitoring kit (FREESTYLE) monitoring kit 1 kit by Does not apply route daily Yes Royce Villa MD   Lancets MISC Use to check blood sugar once daily in the morning (fasting) and as needed for symptoms Yes Royce Villa MD   blood glucose monitor strips Test daily every morning (fasting) & as needed for symptoms of irregular blood glucose. Yes Royce Villa MD   sildenafil (VIAGRA) 25 MG tablet Take 1 tablet by mouth daily as needed for Erectile Dysfunction Yes Ama Flatness, DEBBIE - KAI   hydroCHLOROthiazide (HYDRODIURIL) 25 MG tablet Take 1 tablet by mouth daily Yes Kale David MD   metoprolol succinate (TOPROL XL) 100 MG extended release tablet Take 1 tablet by mouth daily At bedtime Yes Kale David MD   spironolactone (ALDACTONE) 25 MG tablet Take 1 tablet by mouth daily Yes Kale David MD   valsartan (DIOVAN) 320 MG tablet Take 1 tablet by mouth daily Yes Kale David MD   colchicine (COLCRYS) 0.6 MG tablet Take 1 tablet by mouth daily for 10 days  Royce Villa MD   gabapentin (NEURONTIN) 300 MG capsule Take 1 capsule by mouth 3 times daily for 30 days.  Intended supply: 30 days  Royce Villa MD       Social History     Tobacco Use    Smoking status: Former Smoker     Types: Cigars    Smokeless tobacco: Never Used   Substance Use Topics    Alcohol use: Not Currently     Comment: occas    Drug use: No        Past Medical History:   Diagnosis Date    CHF (congestive heart failure) (HCC)     Diabetes mellitus ([facility] [address]     HLD (hyperlipidemia)     Hypertension        Past Surgical History:   Procedure Laterality Date    KNEE SURGERY Right 1997    ACL, PCL tear     No flowsheet data found. PHYSICAL EXAMINATION:  [ INSTRUCTIONS:  \"[x]\" Indicates a positive item  \"[]\" Indicates a negative item  -- DELETE ALL ITEMS NOT EXAMINED]  Vital Signs: (As obtained by patient/caregiver or practitioner observation)    Blood pressure-  Heart rate-    Respiratory rate-    Temperature-  Pulse oximetry-     Constitutional: [x] Appears well-developed and well-nourished [x] No apparent distress      [] Abnormal-   Mental status  [x] Alert and awake  [x] Oriented to person/place/time [x]Able to follow commands      Eyes:  EOM    [x]  Normal  [] Abnormal-  Sclera  [x]  Normal  [] Abnormal -         Discharge [x]  None visible  [] Abnormal -    HENT:   [x] Normocephalic, atraumatic. [] Abnormal   [] Mouth/Throat: Mucous membranes are moist.     External Ears [] Normal  [] Abnormal-     Neck: [x] No visualized mass     Pulmonary/Chest: [x] Respiratory effort normal.  [x] No visualized signs of difficulty breathing or respiratory distress        [] Abnormal-      Musculoskeletal:   [] Normal gait with no signs of ataxia         [] Normal range of motion of neck        [] Abnormal-       Neurological:        [x] No Facial Asymmetry (Cranial nerve 7 motor function) (limited exam to video visit)          [x] No gaze palsy        [] Abnormal-         Skin:        [x] No significant exanthematous lesions or discoloration noted on facial skin         [] Abnormal-            Psychiatric:       [x] Normal Affect [x] No Hallucinations        [] Abnormal-     Other pertinent observable physical exam findings-     ASSESSMENT/PLAN:   Diagnosis Orders   1. Acute gout of left foot, unspecified cause     2. Chronic congestive heart failure, unspecified heart failure type ([facility] [address]     3. Essential hypertension     4.  Chronic combined systolic and diastolic heart failure (HCC)       Acute gout of left foot  Pain is Patient and provider were located at their individual homes. --DEBBIE Molina - CNP on 7/21/2020 at 12:51 PM    An electronic signature was used to authenticate this note.

## 2020-07-21 NOTE — ASSESSMENT & PLAN NOTE
Unable to get a blood pressure on the patient at this time.   Ports he is compliant with his medications

## 2020-07-21 NOTE — ASSESSMENT & PLAN NOTE
Echo in 2019 indicates 40 to 45% ejection fraction to schedule to have this repeated. Sees Dr. Brody Leggett with cardiology.

## 2020-08-04 ENCOUNTER — VIRTUAL VISIT (OUTPATIENT)
Dept: CARDIOLOGY CLINIC | Age: 50
End: 2020-08-04

## 2020-08-04 PROCEDURE — 99214 OFFICE O/P EST MOD 30 MIN: CPT | Performed by: INTERNAL MEDICINE

## 2020-08-04 NOTE — PROGRESS NOTES
[address] [facility]    Provider location: [address] [facility] office  Patient location: Home  Other participants:  None    Due to limiting exposure to COVID-19, we are conducting our visit with the patient as a virtual visit today    Nida Anthony is a 53 y.o. male evaluated via phone (phone / video) on 8/4/2020    Consent:  He and/or health care decision maker is aware that that he may receive a bill for this telephone service, depending on his insurance coverage, and has provided verbal consent to proceed: Yes    I affirm this is a Patient Initiated Episode with a Patient who has not had a related appointment within my department in the past 7 days or scheduled within the next 24 hours. Total Time: 35 minutes    Phone visit CODES [phone] (up to 10min), [phone] (11-20 min), [phone] (21-30 min). Video visit Poli - 5 for LOS 1-5. Note: not billable if this call serves to triage the patient into an appointment for the relevant concern      Cc: HFrEF    HPI:     Mr Barrett is a 52 yo man with extreme morbid obesity (BMI 60, 439 lbs), DM, HTN, HFrEF (EF 20-25%), poor compliance with meds.      Patient is a school  with long hours at work and does not want to take any meds prior to starting work because of concern for side effects and having incontinence while driving the [email or web address]. He lives alone. His shift has changed to 12 MN to 8 am. HF meds have been adjusted to once daily to accommodate his schedule and preferences for compliance. He used to follow with Dr. Nighat Faye, but wanted to consolidate his cardiac care with me and Carol Lakhani. He denies any smoking, alcohol, drugs.      Echo 10/25/18: mild LV dilatation, EF 20-25%, RV is dilated and dysfunctional, RVSP 52, no valve abnormalities.      Echo 03/2019: mild LVH, LVEF 40-45%, nl RV, normal valves, AoR 4.2 cm.      Coronary calcium score unable to obtain due to extreme obesity and granular images. Patient returns to the clinic today for a follow up. He wanted a virtual (phone) visit because \"he cannot tolerate the mask\". He reports feeling fine, no more leg edema, no congestive symptoms. His torsemide 100 daily from last visit was discontinued after he had a BMP done on 4/6 (Cr 1.9 up from 1.1 on 3/17 and K 4.9). He reports compliance with the rest of his meds. He has not checked his vital signs or weight recently. Histories     Past Medical History:   has a past medical history of CHF (congestive heart failure) ([facility] [address] Diabetes mellitus ([facility] [address] HLD (hyperlipidemia), and Hypertension. Surgical History:   has a past surgical history that includes knee surgery (Right, 1997). Social History:   reports that he has quit smoking. His smoking use included cigars. He has never used smokeless tobacco. He reports previous alcohol use. He reports that he does not use drugs. Family History:  No evidence for sudden cardiac death or premature CAD      Medications:     Home medications were reviewed and are listed below    Prior to Admission medications    Medication Sig Start Date End Date Taking? Authorizing Provider   indomethacin (INDOCIN) 50 MG capsule TAKE ONE CAPSULE BY MOUTH THREE TIMES A DAY 7/18/20  Yes DEBBIE Qureshi CNP   allopurinol (ZYLOPRIM) 100 MG tablet Take 1 tablet by mouth daily 7/18/20  Yes DEBBIE Qureshi CNP   metFORMIN (GLUCOPHAGE) 500 MG tablet Take 1 tablet by mouth 2 times daily (with meals) 5/11/20  Yes Jenifer Esquivel MD   glucose monitoring kit (FREESTYLE) monitoring kit 1 kit by Does not apply route daily 4/9/20  Yes Bernie Zhang MD   Lancets MISC Use to check blood sugar once daily in the morning (fasting) and as needed for symptoms 4/9/20  Yes Bernie Zhang MD   blood glucose monitor strips Test daily every morning (fasting) & as needed for symptoms of irregular blood glucose.  4/9/20  Yes Bernie Zhang MD   sildenafil (VIAGRA) 25 MG tablet Take 1 tablet by mouth daily as needed for Erectile Dysfunction 4/3/20  Yes DEBBIE Caldwell - KAI   hydroCHLOROthiazide (HYDRODIURIL) 25 MG tablet Take 1 tablet by mouth daily 3/17/20  Yes Susan Horn MD   metoprolol succinate (TOPROL XL) 100 MG extended release tablet Take 1 tablet by mouth daily At bedtime 3/17/20  Yes Susan Horn MD   spironolactone (ALDACTONE) 25 MG tablet Take 1 tablet by mouth daily 3/17/20  Yes Susan Horn MD   valsartan (DIOVAN) 320 MG tablet Take 1 tablet by mouth daily 3/17/20  Yes Susan Horn MD   colchicine (COLCRYS) 0.6 MG tablet Take 1 tablet by mouth daily for 10 days 6/1/20 6/11/20  Jos Pace MD   gabapentin (NEURONTIN) 300 MG capsule Take 1 capsule by mouth 3 times daily for 30 days. Intended supply: 30 days 6/1/20 7/1/20  Jos Pace MD          Allergy:     Lisinopril       Review of Systems:     All 12 point review of symptoms completed. Pertinent positives identified in the HPI, all other review of symptoms negative as below. CONSTITUTIONAL: No fatigue  SKIN: No rash or pruritis. EYES: No visual changes or diplopia. No scleral icterus. ENT: No Headaches, hearing loss or vertigo. No mouth sores or sore throat. CARDIOVASCULAR: No chest pain/chest pressure/chest discomfort. No palpitations. No edema. RESPIRATORY: No dyspnea. No cough or wheezing, no sputum production. GASTROINTESTINAL: No N/V/D. No abdominal pain, appetite loss, blood in stools. GENITOURINARY: No dysuria, trouble voiding, or hematuria. MUSCULOSKELETAL:  No gait disturbance, weakness or joint complaints. NEUROLOGICAL: No headache, diplopia, change in muscle strength, numbness or tingling. No change in gait, balance, coordination, mood, affect, memory, mentation, behavior. PSHYCH: No anxiety, loss of interest, change in sexual behavior, feelings of self-harm, or confusion. ENDOCRINE: No excessive thirst, fluid intake, or urination. No tremor. HEMATOLOGIC: No abnormal bruising or bleeding.   ALLERGY: No nasal congestion or hives.      Physical Examination:     There were no vitals filed for this visit. Wt Readings from Last 3 Encounters:   04/09/20 (!) 445 lb (201.9 kg)   04/07/20 (!) 445 lb (201.9 kg)   03/17/20 (!) 445 lb (201.9 kg)       Physical exam was not performed due to telehealth visit       Labs:     Lab Results   Component Value Date    WBC 9.4 04/07/2020    HGB 12.0 (L) 04/07/2020    HCT 35.6 (L) 04/07/2020    MCV 93.8 04/07/2020     04/07/2020     Lab Results   Component Value Date     (L) 04/07/2020    K 5.0 04/07/2020    CL 88 (L) 04/07/2020    CO2 22 04/07/2020    BUN 74 (H) 04/07/2020    CREATININE 1.8 (H) 04/07/2020    GLUCOSE 518 (H) 04/07/2020    CALCIUM 10.1 04/07/2020    PROT 6.9 10/24/2018    LABALBU 4.1 10/24/2018    BILITOT 0.3 10/24/2018    ALKPHOS 85 10/24/2018    AST 28 10/24/2018    ALT 20 10/24/2018    LABGLOM 40 (A) 04/07/2020    GFRAA 49 (A) 04/07/2020    AGRATIO 1.5 10/24/2018    GLOB 2.8 10/24/2018         Lab Results   Component Value Date    CHOL 244 (H) 10/25/2018    CHOL 180 11/01/2012     Lab Results   Component Value Date    TRIG 238 (H) 10/25/2018    TRIG 156 (H) 11/01/2012     Lab Results   Component Value Date    HDL 43 10/25/2018    HDL 39 11/01/2012     Lab Results   Component Value Date    LDLCALC 153 (H) 10/25/2018    LDLCALC 110 11/01/2012     Lab Results   Component Value Date    LABVLDL 48 10/25/2018     No results found for: [facility]    Lab Results   Component Value Date    INR 1.02 12/01/2009    PROTIME 11.4 12/01/2009       The 10-year ASCVD risk score (Chidi Perez et al., 2013) is: 25.5%    Values used to calculate the score:      Age: 53 years      Sex: Male      Is Non- : Yes      Diabetic: Yes      Tobacco smoker: No      Systolic Blood Pressure: 316 mmHg      Is BP treated: Yes      HDL Cholesterol: 43 mg/dL      Total Cholesterol: 244 mg/dL      Assessment / Plan:      Diagnosis Orders   1.  Chronic combined systolic and diastolic

## 2020-08-09 ENCOUNTER — PATIENT MESSAGE (OUTPATIENT)
Dept: CARDIOLOGY CLINIC | Age: 50
End: 2020-08-09

## 2020-08-25 NOTE — TELEPHONE ENCOUNTER
From: Mimi Pitt  To: Mary Mejia, DEBBIE - CNP  Sent: 8/9/2020 2:47 PM EDT  Subject: Non-Urgent Medical Question    Cynthia Batres, can I get a recommendation for a handicap parking sticker please? Thanks.      Mimi Pitt   [phone]

## 2020-09-16 ENCOUNTER — OFFICE VISIT (OUTPATIENT)
Dept: FAMILY MEDICINE CLINIC | Age: 50
End: 2020-09-16

## 2020-09-16 VITALS
SYSTOLIC BLOOD PRESSURE: 138 MMHG | BODY MASS INDEX: 57.78 KG/M2 | HEART RATE: 91 BPM | OXYGEN SATURATION: 93 % | WEIGHT: 315 LBS | DIASTOLIC BLOOD PRESSURE: 80 MMHG | TEMPERATURE: 96.6 F

## 2020-09-16 PROBLEM — R73.09 ELEVATED RANDOM BLOOD GLUCOSE LEVEL: Status: ACTIVE | Noted: 2020-09-16

## 2020-09-16 PROBLEM — R73.9 ELEVATED RANDOM BLOOD GLUCOSE LEVEL: Status: ACTIVE | Noted: 2020-09-16

## 2020-09-16 LAB — HBA1C MFR BLD: 10.2 %

## 2020-09-16 PROCEDURE — 99214 OFFICE O/P EST MOD 30 MIN: CPT | Performed by: NURSE PRACTITIONER

## 2020-09-16 PROCEDURE — 83036 HEMOGLOBIN GLYCOSYLATED A1C: CPT | Performed by: NURSE PRACTITIONER

## 2020-09-16 ASSESSMENT — PATIENT HEALTH QUESTIONNAIRE - PHQ9
2. FEELING DOWN, DEPRESSED OR HOPELESS: 0
SUM OF ALL RESPONSES TO PHQ QUESTIONS 1-9: 0
SUM OF ALL RESPONSES TO PHQ QUESTIONS 1-9: 0
SUM OF ALL RESPONSES TO PHQ9 QUESTIONS 1 & 2: 0
1. LITTLE INTEREST OR PLEASURE IN DOING THINGS: 0

## 2020-09-16 NOTE — PROGRESS NOTES
Subjective:      Patient ID: Jonathan Bridges is a 53 y.o. male who presents for:   Chief Complaint   Patient presents with    Diabetes     Treatment Adherence:   Medication compliance:  was taken off meds  Diet compliance:  compliant most of the time  Weight trend: decreasing  Current exercise: no regular exercise  Barriers: none    Diabetes Mellitus Type 2: Current symptoms/problems include none and neuropathy. Home blood sugar records: patient does not test  Any episodes of hypoglycemia? no  Eye exam current (within one year): no  Tobacco history: He  reports that he has quit smoking. His smoking use included cigars. He has never used smokeless tobacco.   Daily Aspirin? No: sees cardiology    Hypertension:  Home blood pressure monitoring: No.  He is not adherent to a low sodium diet. Patient denies chest pain, shortness of breath, headache, lightheadedness, blurred vision, peripheral edema, palpitations, dry cough and fatigue. Antihypertensive medication side effects: no medication side effects noted. Use of agents associated with hypertension: none. Hyperlipidemia:  No new myalgias or GI upset on not on a statin. Lab Results   Component Value Date    LABA1C 10.2 09/16/2020    LABA1C 11.3 04/07/2020    LABA1C 6.4 10/25/2018     Lab Results   Component Value Date    LABMICR Not Indicated 04/07/2020    CREATININE 1.8 (H) 04/07/2020     Lab Results   Component Value Date    ALT 20 10/24/2018    AST 28 10/24/2018     Lab Results   Component Value Date    CHOL 244 (H) 10/25/2018    TRIG 238 (H) 10/25/2018    HDL 43 10/25/2018    LDLCALC 153 (H) 10/25/2018        HPI  Review of Systems   All other systems reviewed and are negative.     Past Medical History:   Diagnosis Date    CHF (congestive heart failure) (HCC)     Diabetes mellitus ([facility] [address]     HLD (hyperlipidemia)     Hypertension      Past Surgical History:   Procedure Laterality Date    KNEE SURGERY Right 1997    ACL, PCL tear     Social History Social History Narrative    Not on file     Family History   Problem Relation Age of Onset    Diabetes type 2  Mother     Heart Failure Mother     Diabetes type 2  Father     Heart Failure Father      Social History     Tobacco Use    Smoking status: Former Smoker     Types: Cigars    Smokeless tobacco: Never Used   Substance Use Topics    Alcohol use: Not Currently     Comment: occas     Allergies   Allergen Reactions    Lisinopril Other (See Comments)     Caused a cough     Current Outpatient Medications   Medication Sig Dispense Refill    Handicap Plac[facility] Patient unable to walk more than 250 feet without resting. DX: CHF  Not to exceed 5 years 1 each 0    indomethacin (INDOCIN) 50 MG capsule TAKE ONE CAPSULE BY MOUTH THREE TIMES A DAY 30 capsule 0    allopurinol (ZYLOPRIM) 100 MG tablet Take 1 tablet by mouth daily 90 tablet 1    glucose monitoring kit (FREESTYLE) monitoring kit 1 kit by Does not apply route daily 1 kit 0    Lancets MISC Use to check blood sugar once daily in the morning (fasting) and as needed for symptoms 100 each 2    blood glucose monitor strips Test daily every morning (fasting) & as needed for symptoms of irregular blood glucose. 100 strip 1    sildenafil (VIAGRA) 25 MG tablet Take 1 tablet by mouth daily as needed for Erectile Dysfunction 30 tablet 1    hydroCHLOROthiazide (HYDRODIURIL) 25 MG tablet Take 1 tablet by mouth daily 90 tablet 3    metoprolol succinate (TOPROL XL) 100 MG extended release tablet Take 1 tablet by mouth daily At bedtime 90 tablet 3    spironolactone (ALDACTONE) 25 MG tablet Take 1 tablet by mouth daily 90 tablet 3    colchicine (COLCRYS) 0.6 MG tablet Take 1 tablet by mouth daily for 10 days 10 tablet 1     No current facility-administered medications for this visit. Patient's past medical history, surgical history, family history, medications,  andallergies  were all reviewed and updated as appropriate today.   Objective: Vitals:    09/16/20 1131   BP: 138/80   Pulse:    Temp:    SpO2:      Physical Exam  Constitutional:       Appearance: He is well-developed. He is obese. HENT:      Head: Normocephalic. Eyes:      Pupils: Pupils are equal, round, and reactive to light. Neck:      Musculoskeletal: Normal range of motion. Cardiovascular:      Rate and Rhythm: Normal rate and regular rhythm. Pulses:           Dorsalis pedis pulses are 1+ on the right side and 1+ on the left side. Heart sounds: Normal heart sounds. Pulmonary:      Effort: Pulmonary effort is normal.      Breath sounds: Normal breath sounds. Musculoskeletal: Normal range of motion. Right foot: Normal range of motion. No deformity, bunion, Charcot foot, foot drop or prominent metatarsal heads. Left foot: Normal range of motion. No deformity, bunion, Charcot foot, foot drop or prominent metatarsal heads. Feet:      Right foot:      Protective Sensation: 8 sites tested. 8 sites sensed. Skin integrity: Skin integrity normal.      Toenail Condition: Right toenails are abnormally thick. Left foot:      Protective Sensation: 8 sites tested. 8 sites sensed. Skin integrity: Skin integrity normal.      Toenail Condition: Left toenails are abnormally thick. Skin:     General: Skin is warm and dry. Neurological:      Mental Status: He is alert and oriented to person, place, and time. Psychiatric:         Mood and Affect: Mood normal.         Behavior: Behavior normal.         Thought Content: Thought content normal.         Assessment      Encounter Diagnoses   Name Primary?     Elevated random blood glucose level Yes    Acute gout of left foot, unspecified cause     Essential hypertension        PLAN:  Health Maintenance   Topic Date Due    Diabetic foot exam  01/28/1981    Diabetic retinal exam  01/28/1981    Diabetic microalbuminuria test  01/28/1989    Lipid screen  10/25/2019    A1C test (Diabetic or Prediabetic) 07/07/2020    Pneumococcal 0-64 years Vaccine (1 of 1 - PPSV23) 09/16/2020 (Originally 1/28/1977)    Hepatitis B vaccine (1 of 3 - Risk 3-dose series) 09/16/2021 (Originally 1/28/1990)    DTaP/Tdap/Td vaccine (1 - Tdap) 09/16/2021 (Originally 1/28/1990)    Flu vaccine (1) 09/16/2021 (Originally 9/1/2020)    Potassium monitoring  04/07/2021    Creatinine monitoring  04/07/2021    Hepatitis A vaccine  Aged Out    Hib vaccine  Aged Out    Meningococcal (ACWY) vaccine  Aged Out    HIV screen  Discontinued     1. Elevated random blood glucose level  Type 2 DM- will restart metformin pending renal function  Foot exam completed  - MICROALBUMIN / CREATININE URINE RATIO; Future  - LIPID PANEL; Future  - HM DIABETES FOOT EXAM  - HEMOGLOBIN A1C; Future  - POCT glycosylated hemoglobin (Hb A1C)    2. Acute gout of left foot, unspecified cause  Flares controlled with daily allopurinol  - URIC ACID; Future    3. Essential hypertension  Rechecked- controlled  - LIPID PANEL; Future      No follow-ups on file.     DEBBIE Beltran - CNP  [place]  9/16/2020

## 2020-09-25 ENCOUNTER — TELEPHONE (OUTPATIENT)
Dept: FAMILY MEDICINE CLINIC | Age: 50
End: 2020-09-25

## 2020-09-25 RX ORDER — GABAPENTIN 100 MG/1
CAPSULE ORAL
Qty: 90 CAPSULE | Refills: 0 | Status: SHIPPED | OUTPATIENT
Start: 2020-09-25 | End: 2021-05-26

## 2020-10-12 RX ORDER — INDOMETHACIN 50 MG/1
50 CAPSULE ORAL
Qty: 90 CAPSULE | Refills: 0 | Status: SHIPPED | OUTPATIENT
Start: 2020-10-12 | End: 2021-05-26

## 2020-10-12 RX ORDER — ALLOPURINOL 100 MG/1
100 TABLET ORAL DAILY
Qty: 90 TABLET | Refills: 1 | Status: SHIPPED | OUTPATIENT
Start: 2020-10-12 | End: 2021-05-26

## 2020-12-11 RX ORDER — GABAPENTIN 300 MG/1
CAPSULE ORAL
Qty: 90 CAPSULE | Refills: 0 | Status: SHIPPED | OUTPATIENT
Start: 2020-12-11 | End: 2021-05-26

## 2021-02-02 ENCOUNTER — PATIENT MESSAGE (OUTPATIENT)
Dept: FAMILY MEDICINE CLINIC | Age: 51
End: 2021-02-02

## 2021-02-08 RX ORDER — GABAPENTIN 300 MG/1
CAPSULE ORAL
Qty: 90 CAPSULE | Refills: 0 | OUTPATIENT
Start: 2021-02-08 | End: 2021-03-10

## 2021-02-10 DIAGNOSIS — I50.42 CHRONIC COMBINED SYSTOLIC AND DIASTOLIC HEART FAILURE (HCC): ICD-10-CM

## 2021-05-26 ENCOUNTER — OFFICE VISIT (OUTPATIENT)
Dept: FAMILY MEDICINE CLINIC | Age: 51
End: 2021-05-26

## 2021-05-26 VITALS
OXYGEN SATURATION: 98 % | HEIGHT: 72 IN | WEIGHT: 315 LBS | BODY MASS INDEX: 42.66 KG/M2 | DIASTOLIC BLOOD PRESSURE: 90 MMHG | SYSTOLIC BLOOD PRESSURE: 160 MMHG | TEMPERATURE: 96.9 F | HEART RATE: 103 BPM

## 2021-05-26 DIAGNOSIS — I50.9 CHRONIC CONGESTIVE HEART FAILURE, UNSPECIFIED HEART FAILURE TYPE (HCC): ICD-10-CM

## 2021-05-26 DIAGNOSIS — R73.09 ELEVATED RANDOM BLOOD GLUCOSE LEVEL: ICD-10-CM

## 2021-05-26 DIAGNOSIS — Z11.59 ENCOUNTER FOR HEPATITIS C SCREENING TEST FOR LOW RISK PATIENT: ICD-10-CM

## 2021-05-26 DIAGNOSIS — E78.49 OTHER HYPERLIPIDEMIA: ICD-10-CM

## 2021-05-26 DIAGNOSIS — M10.9 ACUTE GOUT OF LEFT FOOT, UNSPECIFIED CAUSE: Primary | ICD-10-CM

## 2021-05-26 DIAGNOSIS — I10 ESSENTIAL HYPERTENSION: ICD-10-CM

## 2021-05-26 LAB — HBA1C MFR BLD: 10.7 %

## 2021-05-26 PROCEDURE — 83036 HEMOGLOBIN GLYCOSYLATED A1C: CPT | Performed by: NURSE PRACTITIONER

## 2021-05-26 PROCEDURE — 99213 OFFICE O/P EST LOW 20 MIN: CPT | Performed by: NURSE PRACTITIONER

## 2021-05-26 ASSESSMENT — PATIENT HEALTH QUESTIONNAIRE - PHQ9
SUM OF ALL RESPONSES TO PHQ QUESTIONS 1-9: 0
SUM OF ALL RESPONSES TO PHQ QUESTIONS 1-9: 0
SUM OF ALL RESPONSES TO PHQ9 QUESTIONS 1 & 2: 0
1. LITTLE INTEREST OR PLEASURE IN DOING THINGS: 0
SUM OF ALL RESPONSES TO PHQ QUESTIONS 1-9: 0

## 2021-05-26 ASSESSMENT — ENCOUNTER SYMPTOMS
EYES NEGATIVE: 1
ALLERGIC/IMMUNOLOGIC NEGATIVE: 1
GASTROINTESTINAL NEGATIVE: 1
SHORTNESS OF BREATH: 1

## 2021-05-26 NOTE — PROGRESS NOTES
follow him closely with blood pressures. I have offered to give him free medications to get his blood pressures and blood sugars under control however he declines them at this time. Return in about 4 weeks (around 6/23/2021). SUBJECTIVE/OBJECTIVE:  Jeanne Peguero is a 60-year-old black gentleman with history significant for CHF, CMP, HTN, DM 2, gout. He presents today for follow-up on his diabetes. He tells me that he has taken himself off all of his medications that he does not need them anymore. He feels he is exercising and he is losing weight and he does not need them. He does not believe that the blood pressure readings that we are getting are accurate and states he will take care of them with his diet and exercise. He does show he has had about a 25 pound weight loss since the fall. He is uninsured and therefore has not had any lab work done for his annual exams. He denies any chest pain or shortness of breath however does have some tachycardia when he is ambulating and he reports this is his aerobic workout. He is scheduled to follow-up with cardiology next week and would like to wait to discuss this with them. He does not check his blood sugars he does state he does not eat very much and he does not eat many sugars. He does take an occasional hydrochlorothiazide when he feels like his legs are swollen. He does not check his blood sugars at home      Current Outpatient Medications   Medication Sig Dispense Refill    glucose monitoring kit (FREESTYLE) monitoring kit 1 kit by Does not apply route daily 1 kit 0    Lancets MISC Use to check blood sugar once daily in the morning (fasting) and as needed for symptoms 100 each 2    blood glucose monitor strips Test daily every morning (fasting) & as needed for symptoms of irregular blood glucose.  100 strip 1    hydroCHLOROthiazide (HYDRODIURIL) 25 MG tablet Take 1 tablet by mouth daily 90 tablet 3     No current facility-administered medications for this visit. Review of Systems   Constitutional: Positive for diaphoresis. HENT: Negative. Eyes: Negative. Respiratory: Positive for shortness of breath. Cardiovascular:        Tachycardia with exertion   Gastrointestinal: Negative. Endocrine: Negative. Genitourinary: Negative. Musculoskeletal: Negative. Allergic/Immunologic: Negative. Neurological: Negative. Hematological: Negative. Psychiatric/Behavioral: Negative. Vitals:    05/26/21 1129   BP: (!) 160/90   Pulse: 103   Temp: 96.9 °F (36.1 °C)   SpO2: 98%   Weight: (!) 393 lb (178.3 kg)   Height: 6' (1.829 m)       Physical Exam  Constitutional:       General: He is not in acute distress. Appearance: He is well-developed. He is obese. He is not ill-appearing, toxic-appearing or diaphoretic. HENT:      Head: Normocephalic and atraumatic. Right Ear: External ear normal.      Left Ear: External ear normal.      Nose: Nose normal.      Mouth/Throat:      Pharynx: No oropharyngeal exudate. Eyes:      General:         Right eye: No discharge. Left eye: No discharge. Conjunctiva/sclera: Conjunctivae normal.      Pupils: Pupils are equal, round, and reactive to light. Neck:      Thyroid: No thyromegaly. Trachea: No tracheal deviation. Cardiovascular:      Rate and Rhythm: Normal rate and regular rhythm. Heart sounds: Normal heart sounds. No murmur heard. No friction rub. No gallop. Pulmonary:      Effort: Pulmonary effort is normal. No respiratory distress. Breath sounds: Normal breath sounds. No stridor. No wheezing or rales. Chest:      Chest wall: No tenderness. Abdominal:      General: There is no distension. Palpations: There is no mass. Tenderness: There is no abdominal tenderness. There is no guarding or rebound. Hernia: No hernia is present. Musculoskeletal:         General: No tenderness or deformity. Normal range of motion.       Cervical back: Normal range of motion and neck supple. Lymphadenopathy:      Cervical: No cervical adenopathy. Skin:     General: Skin is warm and dry. Capillary Refill: Capillary refill takes less than 2 seconds. Coloration: Skin is not pale. Findings: No erythema or rash. Neurological:      Mental Status: He is alert and oriented to person, place, and time. Cranial Nerves: No cranial nerve deficit. Sensory: No sensory deficit. Motor: No abnormal muscle tone. Coordination: Coordination normal.   Psychiatric:         Behavior: Behavior normal.         Thought Content: Thought content normal.         Judgment: Judgment normal.                 An electronic signature was used to authenticate this note.     --Fransisco Bernardo, DEBBIE - CNP

## 2021-05-26 NOTE — ASSESSMENT & PLAN NOTE
Blood pressure not well controlled at all. I have encouraged him to take his blood pressure medications on a regular basis however he declines doing this. He states he understands the risks and does not feel he is in any cardiac danger.

## 2021-05-26 NOTE — ASSESSMENT & PLAN NOTE
Diabetes type 2, A1c 10.7. I have encouraged him to check his sugars regularly and to control his diet will we can discuss diabetic control. He refuses to take any blood sugar medications at this time. He feels he is at no risk for diabetic complications.   I have discussed this with him and he refuses

## 2021-06-25 PROBLEM — Z11.59 ENCOUNTER FOR HEPATITIS C SCREENING TEST FOR LOW RISK PATIENT: Status: RESOLVED | Noted: 2021-05-26 | Resolved: 2021-06-25

## 2021-07-13 ENCOUNTER — OFFICE VISIT (OUTPATIENT)
Dept: CARDIOLOGY CLINIC | Age: 51
End: 2021-07-13
Payer: COMMERCIAL

## 2021-07-13 VITALS
SYSTOLIC BLOOD PRESSURE: 160 MMHG | HEART RATE: 112 BPM | OXYGEN SATURATION: 94 % | BODY MASS INDEX: 42.66 KG/M2 | DIASTOLIC BLOOD PRESSURE: 90 MMHG | WEIGHT: 315 LBS | HEIGHT: 72 IN

## 2021-07-13 DIAGNOSIS — I50.22 CHRONIC SYSTOLIC CONGESTIVE HEART FAILURE (HCC): Primary | ICD-10-CM

## 2021-07-13 PROCEDURE — G8427 DOCREV CUR MEDS BY ELIG CLIN: HCPCS | Performed by: NURSE PRACTITIONER

## 2021-07-13 PROCEDURE — 3017F COLORECTAL CA SCREEN DOC REV: CPT | Performed by: NURSE PRACTITIONER

## 2021-07-13 PROCEDURE — 1036F TOBACCO NON-USER: CPT | Performed by: NURSE PRACTITIONER

## 2021-07-13 PROCEDURE — 99214 OFFICE O/P EST MOD 30 MIN: CPT | Performed by: NURSE PRACTITIONER

## 2021-07-13 PROCEDURE — G8417 CALC BMI ABV UP PARAM F/U: HCPCS | Performed by: NURSE PRACTITIONER

## 2021-07-13 RX ORDER — HYDROCHLOROTHIAZIDE 25 MG/1
25 TABLET ORAL DAILY
Qty: 30 TABLET | Refills: 9 | Status: SHIPPED | OUTPATIENT
Start: 2021-07-13 | End: 2022-01-26

## 2021-07-13 RX ORDER — TORSEMIDE 100 MG/1
50 TABLET ORAL
Qty: 30 TABLET | Refills: 3
Start: 2021-07-13 | End: 2022-01-26 | Stop reason: SDUPTHER

## 2021-07-13 NOTE — PROGRESS NOTES
CC CHF    HPI:  49 y.o. patient of Dr Luz Reid with chronic systolic CHF (EF 74-09% was 20-25%), HTN, obesity and DM. He is doing great. Back to the gym 3x week. Has RODRIGUEZ after a lot of strenuous activity. He has more energy and walking linger. Very rare LE edema and taking torsemide once a week. Tires to avoid medications if possible and prefers herbal and alternative medications and therapies. He stopped toprol, valsartan and spironolactone. Because he feels well and thinks he is doesn't need them. He ran out of HCTZ but is agreeable to restart for BP control. Past Medical History:   Diagnosis Date    [facility] ([facility] [address]     Diabetes mellitus ([facility] [address]     HLD (hyperlipidemia)     Hypertension      Past Surgical History:   Procedure Laterality Date    KNEE SURGERY Right 1997    ACL, PCL tear     Family History   Problem Relation Age of Onset    Diabetes type 2  Mother     Heart Failure Mother     Diabetes type 2  Father     Heart Failure Father      Social History     Tobacco Use    Smoking status: Former Smoker     Types: Cigars    Smokeless tobacco: Never Used   Vaping Use    Vaping Use: Never used   Substance Use Topics    Alcohol use: Not Currently     Comment: occas    Drug use: No     Allergies:Lisinopril    Review of Systems  General: No changes in weight, or night sweats. HEENT: No blurry or decreased vision. No changes in hearing, nasal discharge or sore throat. Cardiovascular:  See HPI. Respiratory: No cough, hemoptysis, or wheezing. Gastrointestinal:  No abdominal pain, hematochezia, melana, constipation, diarrhea, or history of GI ulcers. Genito-Urinary: No dysuria or hematuria. No urgency or polyuria. Musculoskeletal:  No complaints of joint pain, joint swelling or muscular weakness/soreness. Neurological:  No dizziness, headaches, numbness/tingling, speech problems or weakness. Psychological:  No anxiety or depression.   Hematological and Lymphatic: No abnormal bleeding or bruising, blood clots, jaundice or swollen lymph nodes. Endocrine:   No malaise/lethargy, palpitations, polydipsia/polyuria, temperature intolerance or unexpected weight changes  Skin:  No rashes or non-healing ulcers. Physical Exam:  BP (!) 160/90 (Site: Left Lower Arm, Position: Sitting, Cuff Size: Medium Adult)   Pulse 112   Ht 6' (1.829 m)   Wt (!) 420 lb 3.2 oz (190.6 kg)   SpO2 94%   BMI 56.99 kg/m²    General (appearance):  No acute distress. obese  Eyes: anicteric   Neck: soft, No JVD  Ears/Nose/Mouth/Thorat: No cyanosis  CV: RRR  Respiratory:  Clear, normal effort  GI: soft, non-tender, non-distended  Skin: Warm, dry. No rashes  Neuro/Psych: Alert and oriented x 3. Appropriate behavior  Ext:  No c/c.  No pitting edema  Pulses:  2+ radial     Weight  Wt Readings from Last 3 Encounters:   07/13/21 (!) 420 lb 3.2 oz (190.6 kg)   05/26/21 (!) 393 lb (178.3 kg)   09/16/20 (!) 426 lb (193.2 kg)          CBC:   Lab Results   Component Value Date    WBC 9.4 04/07/2020    HGB 12.0 (L) 04/07/2020    HCT 35.6 (L) 04/07/2020    MCV 93.8 04/07/2020     04/07/2020     BMP:  Lab Results   Component Value Date    CREATININE 1.8 (H) 04/07/2020    BUN 74 (H) 04/07/2020     (L) 04/07/2020    K 5.0 04/07/2020    CL 88 (L) 04/07/2020    CO2 22 04/07/2020     Mag:   Lab Results   Component Value Date    MG 2.00 10/25/2018     LIVER PROFILE:   Lab Results   Component Value Date    ALT 20 10/24/2018    AST 28 10/24/2018    ALKPHOS 85 10/24/2018    BILITOT 0.3 10/24/2018     PT/INR:   Lab Results   Component Value Date    INR 1.02 12/01/2009    PROTIME 11.4 12/01/2009     Pro-BNP   Lab Results   Component Value Date    PROBNP 45 04/06/2020    PROBNP 101 03/17/2020    PROBNP 95 01/10/2019     LIPIDS:  No components found for: CHLPL  Lab Results   Component Value Date    TRIG 238 (H) 10/25/2018    TRIG 156 (H) 11/01/2012     Lab Results   Component Value Date    HDL 43 10/25/2018    HDL 39 2012     Lab Results   Component Value Date    LDLCALC 153 (H) 10/25/2018    LDLCALC 110 2012     Lab Results   Component Value Date    LABVLDL 48 10/25/2018     TSH:  Lab Results   Component Value Date    TSH 1.36 2012       IMAGIN Echo:   Technically difficult examination with Definity. Mild concentric left ventricular hypertrophy. The left ventricle appears at the upper limits of normal in size. Globally mildly reduced left ventricular systolic function with an estimated   ejection fraction of 40-45%. The aortic root is dilated at 4.2 cm. 2018 Echo:   Left ventricular cavity size is dilated. There is moderate concentric left   ventricular hypertrophy. Overall left ventricular systolic function appears severely reduced with an   ejection fraction visually estimated at 20-25%. Global hypokinesis of the left ventricle. Diastolic filling parameters suggest grade III diastolic dysfunction. Mild mitral regurgitation is present. Trivial tricuspid regurgitation. Estimated pulmonary artery systolic pressure is elevated at 52 mmHg assuming   a right atrial pressure of 15mmHg. Biatrial enlargement. The right ventricle is enlarged and hypokinetic. TAPSE 1.2 cm   RV S velocity 9.3 cm/s   The aortic root is dilated at 4.1 cm.      [facility]  RESULTS OF THE PROCEDURE ARE AS FOLLOWS-   1.  Left main normal.   2.  LAD normal.   3.  Circumflex normal.   4.  Right coronary artery dominant, normal.   5.  LV angiography demonstrates low normal EF of 50%. 6.  LVEDP of 25.   7.  Aortic pressure 130/98. 8.  LV pressure 130/25.        Medications:   Current Outpatient Medications   Medication Sig Dispense Refill    glucose monitoring kit (FREESTYLE) monitoring kit 1 kit by Does not apply route daily 1 kit 0    Lancets MISC Use to check blood sugar once daily in the morning (fasting) and as needed for symptoms 100 each 2    blood glucose monitor strips Test daily every morning (fasting) & as needed for symptoms of irregular blood glucose. 100 strip 1    hydroCHLOROthiazide (HYDRODIURIL) 25 MG tablet Take 1 tablet by mouth daily 90 tablet 3     No current facility-administered medications for this visit. Assessment:  No diagnosis found. Plan:  Chronic sCHF; Not taking or interested in taking CHF medications.      Agreeable to recheck echo and see what EF is now that he is off these medications   Taking torsemide once a week   Does not appear to be in acute decompensation    HTN; uncontrolled    Restarting HCTZ    Has appointment with Dr Jessee Tadeo in January   See me sooner if symptoms change or if echo is concerning       Reviewed most recent: CBC, BMP, LFT, Lipids, Mag, PT/INR, BNP, TSH  Reviewed most recent: ECG, Echo, LHC    Total time spent today for this encounter:  minutes

## 2021-08-20 ENCOUNTER — TELEPHONE (OUTPATIENT)
Dept: FAMILY MEDICINE CLINIC | Age: 51
End: 2021-08-20

## 2021-08-20 NOTE — TELEPHONE ENCOUNTER
Called back and left detailed message that he is due for his diabetic follow up appt and needs an A1C. Please set up an appt.

## 2021-11-15 NOTE — TELEPHONE ENCOUNTER
Requested Prescriptions     Pending Prescriptions Disp Refills    sildenafil (VIAGRA) 25 MG tablet [Pharmacy Med Name: SILDENAFIL 25 MG TABLET] 30 tablet 1     Sig: TAKE ONE TABLET BY MOUTH DAILY AS NEEDED FOR ERECTILE DYSFUNCTION          Number: 30    Refills: 5    Last Office Visit: 7/13/2021     Next Office Visit: 1/18/2022

## 2021-11-16 RX ORDER — SILDENAFIL 25 MG/1
TABLET, FILM COATED ORAL
Qty: 30 TABLET | Refills: 5 | Status: SHIPPED | OUTPATIENT
Start: 2021-11-16 | End: 2022-01-26 | Stop reason: SDUPTHER

## 2022-01-26 ENCOUNTER — OFFICE VISIT (OUTPATIENT)
Dept: CARDIOLOGY CLINIC | Age: 52
End: 2022-01-26
Payer: COMMERCIAL

## 2022-01-26 VITALS
DIASTOLIC BLOOD PRESSURE: 88 MMHG | HEIGHT: 72 IN | HEART RATE: 106 BPM | SYSTOLIC BLOOD PRESSURE: 140 MMHG | WEIGHT: 315 LBS | BODY MASS INDEX: 42.66 KG/M2

## 2022-01-26 DIAGNOSIS — I50.42 CHRONIC COMBINED SYSTOLIC AND DIASTOLIC HEART FAILURE (HCC): Primary | ICD-10-CM

## 2022-01-26 DIAGNOSIS — I42.0 DILATED CARDIOMYOPATHY (HCC): ICD-10-CM

## 2022-01-26 DIAGNOSIS — I10 ESSENTIAL HYPERTENSION: ICD-10-CM

## 2022-01-26 PROCEDURE — 3017F COLORECTAL CA SCREEN DOC REV: CPT | Performed by: NURSE PRACTITIONER

## 2022-01-26 PROCEDURE — 1036F TOBACCO NON-USER: CPT | Performed by: NURSE PRACTITIONER

## 2022-01-26 PROCEDURE — G8417 CALC BMI ABV UP PARAM F/U: HCPCS | Performed by: NURSE PRACTITIONER

## 2022-01-26 PROCEDURE — 99214 OFFICE O/P EST MOD 30 MIN: CPT | Performed by: NURSE PRACTITIONER

## 2022-01-26 PROCEDURE — G8484 FLU IMMUNIZE NO ADMIN: HCPCS | Performed by: NURSE PRACTITIONER

## 2022-01-26 PROCEDURE — G8427 DOCREV CUR MEDS BY ELIG CLIN: HCPCS | Performed by: NURSE PRACTITIONER

## 2022-01-26 PROCEDURE — 93000 ELECTROCARDIOGRAM COMPLETE: CPT | Performed by: NURSE PRACTITIONER

## 2022-01-26 RX ORDER — VALSARTAN 80 MG/1
80 TABLET ORAL DAILY
Qty: 90 TABLET | Refills: 1 | Status: SHIPPED | OUTPATIENT
Start: 2022-01-26 | End: 2022-08-18 | Stop reason: SDUPTHER

## 2022-01-26 RX ORDER — SILDENAFIL 25 MG/1
25 TABLET, FILM COATED ORAL PRN
Qty: 30 TABLET | Refills: 5 | Status: SHIPPED | OUTPATIENT
Start: 2022-01-26

## 2022-01-26 RX ORDER — TORSEMIDE 100 MG/1
50 TABLET ORAL DAILY
Qty: 90 TABLET | Refills: 3 | Status: SHIPPED | OUTPATIENT
Start: 2022-01-26

## 2022-01-26 NOTE — PROGRESS NOTES
CC CHF    HPI:  49 y.o. patient of Dr Alphonso Nicole with chronic systolic CHF (EF 99-45% was 20-25%), HTN, obesity, CKD and DM. He notes RODRIGUEZ, orthopnea, weight gain and edema. He feels \"full and heavy\". The only prescription med he takes is the HCTZ. He would like to avoid medications or at least try more holistic/herbal medications. He is struggling with depression and therefore isn't exercising like he was before. He limits salt but drinks a lot of fluid. No chest pain, LH/dizziness, palpitations, syncope or falls. No n/v/d, fever, chills or /GI bleeding. Past Medical History:   Diagnosis Date    [facility]F [facility]congestive [facility] failure) ([facility] [address]     Diabetes mellitus ([facility] [address]     HLD (hyperlipidemia)     Hypertension      Past Surgical History:   Procedure Laterality Date    KNEE SURGERY Right 1997    ACL, PCL tear     Family History   Problem Relation Age of Onset    Diabetes type 2  Mother     Heart Failure Mother     Diabetes type 2  Father     Heart Failure Father      Social History     Tobacco Use    Smoking status: Former Smoker     Types: Cigars    Smokeless tobacco: Never Used   Vaping Use    Vaping Use: Never used   Substance Use Topics    Alcohol use: Not Currently     Comment: occas    Drug use: No     Allergies:Lisinopril    Review of Systems  General: No changes in weight, or night sweats. +weight gain   HEENT: No blurry or decreased vision. No changes in hearing, nasal discharge or sore throat. Cardiovascular:  See HPI. Respiratory: No cough, hemoptysis, or wheezing. Gastrointestinal:  No abdominal pain, hematochezia, melana, constipation, diarrhea, or history of GI ulcers. Genito-Urinary: No dysuria or hematuria. No urgency or polyuria. + ED  Musculoskeletal:  No complaints of joint pain, joint swelling or muscular weakness/soreness. Neurological:  No dizziness, headaches, numbness/tingling, speech problems or weakness. Psychological:  No anxiety or depression.   Hematological and Lymphatic: No abnormal bleeding or bruising, blood clots, jaundice or swollen lymph nodes. Endocrine:   No malaise/lethargy, palpitations, polydipsia/polyuria, temperature intolerance or unexpected weight changes  Skin:  No rashes or non-healing ulcers. Physical Exam:  BP (!) 140/88   Pulse 106   Ht 6' (1.829 m)   Wt (!) 435 lb 9.6 oz (197.6 kg)   BMI 59.08 kg/m²    General (appearance):  No acute distress. obese  Eyes: anicteric   Neck: soft, No JVD  Ears/Nose/Mouth/Thorat: No cyanosis  CV: RRR  Respiratory:  Diminished diffusely. No wheeze or rales. normal effort  GI: soft, non-tender, non-distended  Skin: Warm, dry. No rashes  Neuro/Psych: Alert and oriented x 3.  Appropriate behavior  Ext:  No c/c.+ pitting BLEedema  Pulses:  2+ radial     Weight  Wt Readings from Last 3 Encounters:   07/13/21 (!) 420 lb 3.2 oz (190.6 kg)   05/26/21 (!) 393 lb (178.3 kg)   09/16/20 (!) 426 lb (193.2 kg)          CBC:   Lab Results   Component Value Date    WBC 9.4 04/07/2020    HGB 12.0 (L) 04/07/2020    HCT 35.6 (L) 04/07/2020    MCV 93.8 04/07/2020     04/07/2020     BMP:  Lab Results   Component Value Date    CREATININE 1.8 (H) 04/07/2020    BUN 74 (H) 04/07/2020     (L) 04/07/2020    K 5.0 04/07/2020    CL 88 (L) 04/07/2020    CO2 22 04/07/2020     Mag:   Lab Results   Component Value Date    MG 2.00 10/25/2018     LIVER PROFILE:   Lab Results   Component Value Date    ALT 20 10/24/2018    AST 28 10/24/2018    ALKPHOS 85 10/24/2018    BILITOT 0.3 10/24/2018     PT/INR:   Lab Results   Component Value Date    INR 1.02 12/01/2009    PROTIME 11.4 12/01/2009     Pro-BNP   Lab Results   Component Value Date    PROBNP 45 04/06/2020    PROBNP 101 03/17/2020    PROBNP 95 01/10/2019     LIPIDS:  No components found for: CHLPL  Lab Results   Component Value Date    TRIG 238 (H) 10/25/2018    TRIG 156 (H) 11/01/2012     Lab Results   Component Value Date    HDL 43 10/25/2018    HDL 39 11/01/2012     Lab Results Component Value Date    LDLCALC 153 (H) 10/25/2018    LDLCALC 110 2012     Lab Results   Component Value Date    LABVLDL 48 10/25/2018     TSH:  Lab Results   Component Value Date    TSH 1.36 2012       IMAGIN Echo:   Technically difficult examination with Definity. Mild concentric left ventricular hypertrophy. The left ventricle appears at the upper limits of normal in size. Globally mildly reduced left ventricular systolic function with an estimated   ejection fraction of 40-45%. The aortic root is dilated at 4.2 cm. 2018 Echo:   Left ventricular cavity size is dilated. There is moderate concentric left   ventricular hypertrophy. Overall left ventricular systolic function appears severely reduced with an   ejection fraction visually estimated at 20-25%. Global hypokinesis of the left ventricle. Diastolic filling parameters suggest grade III diastolic dysfunction. Mild mitral regurgitation is present. Trivial tricuspid regurgitation. Estimated pulmonary artery systolic pressure is elevated at 52 mmHg assuming   a right atrial pressure of 15mmHg. Biatrial enlargement. The right ventricle is enlarged and hypokinetic. TAPSE 1.2 cm   RV S velocity 9.3 cm/s   The aortic root is dilated at 4.1 cm.      [facility]  RESULTS OF THE PROCEDURE ARE AS FOLLOWS-   1.  Left main normal.   2.  LAD normal.   3.  Circumflex normal.   4.  Right coronary artery dominant, normal.   5.  LV angiography demonstrates low normal EF of 50%. 6.  LVEDP of 25.   7.  Aortic pressure 130/98. 8.  LV pressure 130/25.        Medications:   Current Outpatient Medications   Medication Sig Dispense Refill    sildenafil (VIAGRA) 25 MG tablet TAKE ONE TABLET BY MOUTH DAILY AS NEEDED FOR ERECTILE DYSFUNCTION 30 tablet 5    torsemide (DEMADEX) 100 MG tablet Take 0.5 tablets by mouth every 7 days 30 tablet 3    hydroCHLOROthiazide (HYDRODIURIL) 25 MG tablet Take 1 tablet by mouth daily 30 tablet 9  Handicap Placard MISC by Does not apply route Shortness of breath  Exp 7/13/2026 1 each 0    glucose monitoring kit (FREESTYLE) monitoring kit 1 kit by Does not apply route daily 1 kit 0    Lancets MISC Use to check blood sugar once daily in the morning (fasting) and as needed for symptoms 100 each 2    blood glucose monitor strips Test daily every morning (fasting) & as needed for symptoms of irregular blood glucose. 100 strip 1     No current facility-administered medications for this visit. Assessment:  1. Chronic combined systolic and diastolic heart failure ([facility] [address]    2. Dilated cardiomyopathy ([facility] [address]    3. Essential hypertension        Plan:  Acute/chronic sCHF; dilated CMP   Discussed R/B/A to CHF medications.     Agreeable to start valsartan 80 mg po daily   Restart Torsemide 50 mg po daily   BMP in 1 week    Will recheck Echo   Low salt diet, 2 liter fluid restrictions, daily weights     HTN; uncontrolled    Valsartan 80 mg po daily   BMP in 1 week     Follow up in 4-6 weeks     Reviewed most recent: CBC, BMP, LFT, Lipids, Mag, PT/INR, BNP, TSH  Reviewed most recent: ECG, Echo, LHC

## 2022-08-15 ENCOUNTER — OFFICE VISIT (OUTPATIENT)
Dept: FAMILY MEDICINE CLINIC | Age: 52
End: 2022-08-15
Payer: COMMERCIAL

## 2022-08-15 VITALS
BODY MASS INDEX: 42.66 KG/M2 | HEIGHT: 72 IN | SYSTOLIC BLOOD PRESSURE: 130 MMHG | TEMPERATURE: 97.1 F | WEIGHT: 315 LBS | OXYGEN SATURATION: 94 % | HEART RATE: 100 BPM | DIASTOLIC BLOOD PRESSURE: 80 MMHG

## 2022-08-15 DIAGNOSIS — E78.49 OTHER HYPERLIPIDEMIA: ICD-10-CM

## 2022-08-15 DIAGNOSIS — M10.9 ACUTE GOUT OF LEFT FOOT, UNSPECIFIED CAUSE: ICD-10-CM

## 2022-08-15 DIAGNOSIS — R73.09 ELEVATED GLUCOSE: Primary | ICD-10-CM

## 2022-08-15 DIAGNOSIS — I10 ESSENTIAL HYPERTENSION: ICD-10-CM

## 2022-08-15 DIAGNOSIS — E11.65 TYPE 2 DIABETES MELLITUS WITH HYPERGLYCEMIA, WITHOUT LONG-TERM CURRENT USE OF INSULIN (HCC): ICD-10-CM

## 2022-08-15 LAB — HBA1C MFR BLD: 10.2 %

## 2022-08-15 PROCEDURE — 3046F HEMOGLOBIN A1C LEVEL >9.0%: CPT | Performed by: NURSE PRACTITIONER

## 2022-08-15 PROCEDURE — 83036 HEMOGLOBIN GLYCOSYLATED A1C: CPT | Performed by: NURSE PRACTITIONER

## 2022-08-15 PROCEDURE — 99214 OFFICE O/P EST MOD 30 MIN: CPT | Performed by: NURSE PRACTITIONER

## 2022-08-15 RX ORDER — PIOGLITAZONEHYDROCHLORIDE 45 MG/1
45 TABLET ORAL DAILY
Qty: 30 TABLET | Refills: 3 | Status: SHIPPED | OUTPATIENT
Start: 2022-08-15 | End: 2022-09-27

## 2022-08-15 RX ORDER — FLASH GLUCOSE SENSOR
1 KIT MISCELLANEOUS
Qty: 8 EACH | Refills: 5 | Status: SHIPPED | OUTPATIENT
Start: 2022-08-15

## 2022-08-15 SDOH — ECONOMIC STABILITY: FOOD INSECURITY: WITHIN THE PAST 12 MONTHS, YOU WORRIED THAT YOUR FOOD WOULD RUN OUT BEFORE YOU GOT MONEY TO BUY MORE.: NEVER TRUE

## 2022-08-15 SDOH — ECONOMIC STABILITY: FOOD INSECURITY: WITHIN THE PAST 12 MONTHS, THE FOOD YOU BOUGHT JUST DIDN'T LAST AND YOU DIDN'T HAVE MONEY TO GET MORE.: NEVER TRUE

## 2022-08-15 ASSESSMENT — SOCIAL DETERMINANTS OF HEALTH (SDOH): HOW HARD IS IT FOR YOU TO PAY FOR THE VERY BASICS LIKE FOOD, HOUSING, MEDICAL CARE, AND HEATING?: NOT HARD AT ALL

## 2022-08-15 ASSESSMENT — ENCOUNTER SYMPTOMS: SHORTNESS OF BREATH: 1

## 2022-08-15 NOTE — PROGRESS NOTES
Pravin Parra (:  1970) is a 47 y.o. male,Established patient, here for evaluation of the following chief complaint(s): Other (paperwork)      ASSESSMENT/PLAN:  1. Elevated glucose  -     Hemoglobin A1C; Future  -     Urinalysis with Reflex to Culture  -     metFORMIN (GLUCOPHAGE) 500 MG tablet; Take 1 tablet by mouth in the morning and 1 tablet in the evening. Take with meals. , Disp-180 tablet, R-1Normal  -     pioglitazone (ACTOS) 45 MG tablet; Take 1 tablet by mouth in the morning., Disp-30 tablet, R-3Normal  -     dapagliflozin (FARXIGA) 10 MG tablet; Take 1 tablet by mouth every morning, Disp-90 tablet, R-1Normal  -     POCT glycosylated hemoglobin (Hb A1C)  2. Acute gout of left foot, unspecified cause  Assessment & Plan:  Check uric acid today. He has not been taking any medications for this. Orders:  -     Uric Acid; Future  3. Essential hypertension  Assessment & Plan:   Controlled at this time. Encouraged regular use of diuretics as he has been prescribed. 4. Other hyperlipidemia  -     CBC with Auto Differential; Future  -     Comprehensive Metabolic Panel; Future  -     LIPID PANEL; Future  5. Type 2 diabetes mellitus with hyperglycemia, without long-term current use of insulin (HCC)  Assessment & Plan:  Not well controlled at all. A1c over 10 encourage patient to restart using his freestyle monitor, more supplies ordered, patient does refuse to use insulin. Reorder metformin, will start fark [employer] and Actos. He understands the risks related to not controlling his diabetes. Return in about 4 weeks (around 2022). SUBJECTIVE/OBJECTIVE:  Patient presents today as he needs paper signed for him to be a  for work. History of type 2 diabetes uncontrolled major complaint now of bilateral foot pain and neuropathy. He admits he has not been checking his blood sugars regularly nor has he been taking his diabetes medications.   Reports an occasional blood sugar in the high 300s Pulse: 100   Temp: 97.1 °F (36.2 °C)   SpO2: 94%   Weight: (!) 427 lb (193.7 kg)   Height: 6' (1.829 m)       Physical Exam  Constitutional:       Appearance: Normal appearance. He is well-developed. He is obese. HENT:      Head: Normocephalic. Right Ear: Tympanic membrane normal.      Left Ear: Tympanic membrane normal.      Mouth/Throat:      Mouth: Mucous membranes are moist.   Eyes:      Pupils: Pupils are equal, round, and reactive to light. Cardiovascular:      Rate and Rhythm: Normal rate and regular rhythm. Heart sounds: Normal heart sounds. Pulmonary:      Effort: Pulmonary effort is normal.      Breath sounds: Normal breath sounds. Abdominal:      Comments: Unable to assess due to body habitus   Musculoskeletal:         General: Normal range of motion. Cervical back: Normal range of motion. Skin:     General: Skin is warm and dry. Neurological:      Mental Status: He is alert and oriented to person, place, and time. An electronic signature was used to authenticate this note.     --DEBBIE Montez - CNP

## 2022-08-16 NOTE — ASSESSMENT & PLAN NOTE
Not well controlled at all. A1c over 10 encourage patient to restart using his freestyle monitor, more supplies ordered, patient does refuse to use insulin. Reorder metformin, will start fark [employer] and Actos. He understands the risks related to not controlling his diabetes.

## 2022-08-18 ENCOUNTER — OFFICE VISIT (OUTPATIENT)
Dept: CARDIOLOGY CLINIC | Age: 52
End: 2022-08-18
Payer: COMMERCIAL

## 2022-08-18 VITALS
WEIGHT: 315 LBS | HEART RATE: 102 BPM | HEIGHT: 72 IN | BODY MASS INDEX: 42.66 KG/M2 | SYSTOLIC BLOOD PRESSURE: 142 MMHG | OXYGEN SATURATION: 94 % | DIASTOLIC BLOOD PRESSURE: 84 MMHG

## 2022-08-18 DIAGNOSIS — I50.42 CHRONIC COMBINED SYSTOLIC AND DIASTOLIC HEART FAILURE (HCC): Primary | ICD-10-CM

## 2022-08-18 DIAGNOSIS — I42.0 DILATED CARDIOMYOPATHY (HCC): ICD-10-CM

## 2022-08-18 PROCEDURE — 99214 OFFICE O/P EST MOD 30 MIN: CPT | Performed by: NURSE PRACTITIONER

## 2022-08-18 RX ORDER — VALSARTAN 80 MG/1
80 TABLET ORAL DAILY
Qty: 90 TABLET | Refills: 3 | Status: SHIPPED | OUTPATIENT
Start: 2022-08-18

## 2022-08-18 ASSESSMENT — PATIENT HEALTH QUESTIONNAIRE - PHQ9
SUM OF ALL RESPONSES TO PHQ QUESTIONS 1-9: 0
SUM OF ALL RESPONSES TO PHQ9 QUESTIONS 1 & 2: 0
2. FEELING DOWN, DEPRESSED OR HOPELESS: 0
SUM OF ALL RESPONSES TO PHQ QUESTIONS 1-9: 0
1. LITTLE INTEREST OR PLEASURE IN DOING THINGS: 0
SUM OF ALL RESPONSES TO PHQ QUESTIONS 1-9: 0
SUM OF ALL RESPONSES TO PHQ QUESTIONS 1-9: 0

## 2022-08-18 NOTE — PROGRESS NOTES
CC CHF    HPI:  47 y.o. patient of Dr Johnny Lopez with chronic systolic CHF (EF 59-01% was 20-25%), HTN, obesity, CKD and DM. He has ankle edema that improves with elevation. Denies cp, sob, LH/dizziness, palpitations or syncope. No orthopnea, PND, abdominal bloating or early satiety. No n/v/d, fever or Gi/ bleeding. Past Medical History:   Diagnosis Date    CHF (congestive heart failure) (HCC)     Diabetes mellitus (HCC)     HLD (hyperlipidemia)     Hypertension      Past Surgical History:   Procedure Laterality Date    KNEE SURGERY Right 1997    ACL, PCL tear     Family History   Problem Relation Age of Onset    Diabetes type 2  Mother     Heart Failure Mother     Diabetes type 2  Father     Heart Failure Father      Social History     Tobacco Use    Smoking status: Former     Types: Cigars    Smokeless tobacco: Never   Vaping Use    Vaping Use: Never used   Substance Use Topics    Alcohol use: Not Currently     Comment: occas    Drug use: No     Allergies:Lisinopril    Review of Systems  General: No changes in weight, or night sweats. +weight gain   HEENT: No blurry or decreased vision. No changes in hearing, nasal discharge or sore throat. Cardiovascular:  See HPI. Respiratory: No cough, hemoptysis, or wheezing. Gastrointestinal:  No abdominal pain, hematochezia, melana, constipation, diarrhea, or history of GI ulcers. Genito-Urinary: No dysuria or hematuria. No urgency or polyuria. + ED  Musculoskeletal:  No complaints of joint pain, joint swelling or muscular weakness/soreness. Neurological:  No dizziness, headaches, numbness/tingling, speech problems or weakness. Psychological:  No anxiety or depression. Hematological and Lymphatic: No abnormal bleeding or bruising, blood clots, jaundice or swollen lymph nodes. Endocrine:   No malaise/lethargy, palpitations, polydipsia/polyuria, temperature intolerance or unexpected weight changes  Skin:  No rashes or non-healing ulcers.     Physical Exam:  BP (!) 142/84   Pulse (!) 102   Ht 6' (1.829 m)   Wt (!) 419 lb 6.4 oz (190.2 kg)   SpO2 94%   BMI 56.88 kg/m²    General (appearance):  No acute distress. obese  Eyes: anicteric   Neck: soft, No JVD  Ears/Nose/Mouth/Thorat: No cyanosis  CV: RRR  Respiratory:  Diminished diffusely. No wheeze or rales. normal effort  GI: soft, non-tender, non-distended  Skin: Warm, dry. No rashes  Neuro/Psych: Alert and oriented x 3.  Appropriate behavior  Ext:  No c/c. +1BLE edema  Pulses:  2+ radial     Weight  Wt Readings from Last 3 Encounters:   08/18/22 (!) 419 lb 6.4 oz (190.2 kg)   08/15/22 (!) 427 lb (193.7 kg)   01/26/22 (!) 435 lb 9.6 oz (197.6 kg)          CBC:   Lab Results   Component Value Date    WBC 9.4 04/07/2020    HGB 12.0 (L) 04/07/2020    HCT 35.6 (L) 04/07/2020    MCV 93.8 04/07/2020     04/07/2020     BMP:  Lab Results   Component Value Date    CREATININE 1.8 (H) 04/07/2020    BUN 74 (H) 04/07/2020     (L) 04/07/2020    K 5.0 04/07/2020    CL 88 (L) 04/07/2020    CO2 22 04/07/2020     Mag:   Lab Results   Component Value Date/Time    MG 2.00 10/25/2018 05:23 AM     LIVER PROFILE:   Lab Results   Component Value Date    ALT 20 10/24/2018    AST 28 10/24/2018    ALKPHOS 85 10/24/2018    BILITOT 0.3 10/24/2018     PT/INR:   Lab Results   Component Value Date    INR 1.02 12/01/2009    PROTIME 11.4 12/01/2009     Pro-BNP   Lab Results   Component Value Date/Time    PROBNP 45 04/06/2020 12:48 PM    PROBNP 101 03/17/2020 10:50 AM    PROBNP 95 01/10/2019 04:31 PM     LIPIDS:  No components found for: CHLPL  Lab Results   Component Value Date    TRIG 238 (H) 10/25/2018    TRIG 156 (H) 11/01/2012     Lab Results   Component Value Date    HDL 43 10/25/2018    HDL 39 11/01/2012     Lab Results   Component Value Date    LDLCALC 153 (H) 10/25/2018    LDLCALC 110 11/01/2012     Lab Results   Component Value Date    LABVLDL 48 10/25/2018     TSH:  Lab Results   Component Value Date    TSH 1.36 11/02/2012 tablet 3    valsartan (DIOVAN) 80 MG tablet Take 1 tablet by mouth daily 90 tablet 1    sildenafil (VIAGRA) 25 MG tablet Take 1 tablet by mouth as needed for Erectile Dysfunction 30 tablet 5    Handicap Placard MISC by Does not apply route Shortness of breath  Exp 7/13/2026 (Patient not taking: Reported on 8/15/2022) 1 each 0    glucose monitoring kit (FREESTYLE) monitoring kit 1 kit by Does not apply route daily (Patient not taking: Reported on 8/15/2022) 1 kit 0    Lancets MISC Use to check blood sugar once daily in the morning (fasting) and as needed for symptoms (Patient not taking: Reported on 8/15/2022) 100 each 2    blood glucose monitor strips Test daily every morning (fasting) & as needed for symptoms of irregular blood glucose. (Patient not taking: Reported on 8/15/2022) 100 strip 1     No current facility-administered medications for this visit. Assessment:  1. Chronic combined systolic and diastolic heart failure ([facility] [address]    2. Dilated cardiomyopathy ([facility]          Plan:  Chronic HFrEF; dilated CMP: stable   Discussed R/B/A to CHF medications. Will recheck Echo   Low salt diet, 2 liter fluid restrictions, daily weights    Torsemide   Valsartan    HTN; uncontrolled    Valsartan 80 mg po daily       Follow up in 6 month    Pt resistant to taking medications. Prefers homeopathic or herbal medications.    Agreeable to take valsartan and torsemide     Reviewed most recent: CBC, BMP, LFT, Lipids, Mag, PT/INR, BNP, TSH  Reviewed most recent: ECG, Echo, LHC

## 2022-08-31 ENCOUNTER — PROCEDURE VISIT (OUTPATIENT)
Dept: CARDIOLOGY CLINIC | Age: 52
End: 2022-08-31
Payer: COMMERCIAL

## 2022-08-31 DIAGNOSIS — I42.0 DILATED CARDIOMYOPATHY (HCC): ICD-10-CM

## 2022-08-31 DIAGNOSIS — I50.42 CHRONIC COMBINED SYSTOLIC AND DIASTOLIC HEART FAILURE (HCC): ICD-10-CM

## 2022-08-31 LAB
LV EF: 38 %
LVEF MODALITY: NORMAL

## 2022-08-31 PROCEDURE — 93306 TTE W/DOPPLER COMPLETE: CPT | Performed by: INTERNAL MEDICINE

## 2022-08-31 RX ORDER — HYDROCHLOROTHIAZIDE 25 MG/1
TABLET ORAL
Qty: 30 TABLET | Refills: 11 | Status: SHIPPED | OUTPATIENT
Start: 2022-08-31

## 2022-09-01 ENCOUNTER — TELEPHONE (OUTPATIENT)
Dept: CARDIOLOGY CLINIC | Age: 52
End: 2022-09-01

## 2022-09-01 NOTE — TELEPHONE ENCOUNTER
The patient called requesting an update on a work letter that is supposed to be filled and sent to his provider after testing. The patient states [facility] NP has the information. Please call the patient at [phone] with an update.

## 2022-09-01 NOTE — TELEPHONE ENCOUNTER
Spoke with patient and reviewed echo results. Please fill out paperwork, patient needs this in order to work. Then he will make a follow up appointment with Dr. Sanjay Law.

## 2022-09-27 ENCOUNTER — OFFICE VISIT (OUTPATIENT)
Dept: FAMILY MEDICINE CLINIC | Age: 52
End: 2022-09-27
Payer: COMMERCIAL

## 2022-09-27 VITALS
WEIGHT: 315 LBS | TEMPERATURE: 96.8 F | HEART RATE: 117 BPM | OXYGEN SATURATION: 94 % | SYSTOLIC BLOOD PRESSURE: 124 MMHG | DIASTOLIC BLOOD PRESSURE: 80 MMHG | BODY MASS INDEX: 55.47 KG/M2

## 2022-09-27 DIAGNOSIS — I10 ESSENTIAL HYPERTENSION: ICD-10-CM

## 2022-09-27 DIAGNOSIS — R73.09 ELEVATED GLUCOSE: ICD-10-CM

## 2022-09-27 DIAGNOSIS — E11.65 TYPE 2 DIABETES MELLITUS WITH HYPERGLYCEMIA, WITHOUT LONG-TERM CURRENT USE OF INSULIN (HCC): Primary | ICD-10-CM

## 2022-09-27 LAB — HBA1C MFR BLD: 8.1 %

## 2022-09-27 PROCEDURE — 2022F DILAT RTA XM EVC RTNOPTHY: CPT | Performed by: NURSE PRACTITIONER

## 2022-09-27 PROCEDURE — 3052F HG A1C>EQUAL 8.0%<EQUAL 9.0%: CPT | Performed by: NURSE PRACTITIONER

## 2022-09-27 PROCEDURE — 83036 HEMOGLOBIN GLYCOSYLATED A1C: CPT | Performed by: NURSE PRACTITIONER

## 2022-09-27 PROCEDURE — 1036F TOBACCO NON-USER: CPT | Performed by: NURSE PRACTITIONER

## 2022-09-27 PROCEDURE — 99214 OFFICE O/P EST MOD 30 MIN: CPT | Performed by: NURSE PRACTITIONER

## 2022-09-27 PROCEDURE — 3017F COLORECTAL CA SCREEN DOC REV: CPT | Performed by: NURSE PRACTITIONER

## 2022-09-27 PROCEDURE — G8427 DOCREV CUR MEDS BY ELIG CLIN: HCPCS | Performed by: NURSE PRACTITIONER

## 2022-09-27 PROCEDURE — G8417 CALC BMI ABV UP PARAM F/U: HCPCS | Performed by: NURSE PRACTITIONER

## 2022-09-27 NOTE — PROGRESS NOTES
Jesse Monge (:  1970) is a 47 y.o. male,Established patient, here for evaluation of the following chief complaint(s):  Follow-up      ASSESSMENT/PLAN:  1. Elevated glucose  -     POCT glycosylated hemoglobin (Hb A1C)  2. Type 2 diabetes mellitus with hyperglycemia, without long-term current use of insulin (HCC)  3. Essential hypertension      No follow-ups on file. SUBJECTIVE/OBJECTIVE:    Jesse Monge is here today for follow up on his DM. He reports he is mostly compliant with medications and diet. 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 Encouraged planning ahead to help on those longer days with healthy options. Pt encouraged to get labs previously ordered. Current Outpatient Medications   Medication Sig Dispense Refill    metFORMIN (GLUCOPHAGE) 500 MG tablet Take 500 mg by mouth 2 times daily (with meals)      hydroCHLOROthiazide (HYDRODIURIL) 25 MG tablet TAKE 1 TABLET BY MOUTH EVERY DAY 30 tablet 11    valsartan (DIOVAN) 80 MG tablet Take 1 tablet by mouth daily 90 tablet 3    Continuous Blood Gluc Sensor (FREESTYLE LINDA 2 SENSOR) MISC 1 Device by Does not apply route every 14 days 8 each 5    torsemide (DEMADEX) 100 MG tablet Take 0.5 tablets by mouth daily 90 tablet 3    sildenafil (VIAGRA) 25 MG tablet Take 1 tablet by mouth as needed for Erectile Dysfunction 30 tablet 5     No current facility-administered medications for this visit. Review of Systems   Constitutional:  Negative for activity change and fever. Respiratory:  Negative for cough and shortness of breath. Cardiovascular:  Negative for chest pain, palpitations and leg swelling. All other systems reviewed and are negative.     Vitals:    22 1518   BP: 124/80   Site: Left Upper Arm   Position: Sitting   Cuff Size: Large Adult   Pulse: (!) 117   Temp: 96.8 °F (36 °C)   SpO2: 94%   Weight: (!) 409 lb (185.5 kg)               An electronic signature was used to authenticate this note.    --Brice Brady, DEBBIE - CNP

## 2022-09-29 ASSESSMENT — ENCOUNTER SYMPTOMS
SHORTNESS OF BREATH: 0
COUGH: 0

## 2022-09-29 NOTE — ASSESSMENT & PLAN NOTE
Borderline controlled, continue current medications     A1C down to 8.1 from 10.2,    Continue with diet and exercise routine.

## 2022-10-27 ENCOUNTER — TELEPHONE (OUTPATIENT)
Dept: CARDIOLOGY CLINIC | Age: 52
End: 2022-10-27

## 2022-10-27 RX ORDER — METOPROLOL SUCCINATE 25 MG/1
12.5 TABLET, EXTENDED RELEASE ORAL DAILY
Qty: 45 TABLET | Refills: 3 | Status: SHIPPED | OUTPATIENT
Start: 2022-10-27

## 2022-10-27 NOTE — TELEPHONE ENCOUNTER
I did receive a fax from [facility] recommending pt be started in Metoprolol and metformin to be increase. I responded back to Nikolas Christine that I agree metoprolol is appropriate but that Mr Claudean Raveling has typically been resistant to taking medications and was previously only agreeable to taking valsartan and torsemide. I recommended metformin be adjusted by PCP. If patient agreeable to starting metoprolol then I am ok with ordering it.

## 2022-10-27 NOTE — TELEPHONE ENCOUNTER
Eliane from [facility] called in and said that they received a fax from Anayeli Hernadez saying that it was ok for the patient to take Metoprolol Succinate 12.5 mg but never received the prescription for it. Please advise [phone] ext. [phone] and [phone] fax.

## 2022-11-09 RX ORDER — PIOGLITAZONEHYDROCHLORIDE 45 MG/1
45 TABLET ORAL DAILY
Qty: 30 TABLET | Refills: 5 | Status: SHIPPED | OUTPATIENT
Start: 2022-11-09

## 2022-11-09 RX ORDER — PIOGLITAZONEHYDROCHLORIDE 45 MG/1
TABLET ORAL
COMMUNITY
Start: 2022-10-14 | End: 2022-11-09 | Stop reason: SDUPTHER

## 2023-02-01 RX ORDER — SILDENAFIL 25 MG/1
TABLET, FILM COATED ORAL
Qty: 30 TABLET | Refills: 5 | Status: SHIPPED | OUTPATIENT
Start: 2023-02-01

## 2023-02-07 RX ORDER — TORSEMIDE 100 MG/1
50 TABLET ORAL DAILY
Qty: 90 TABLET | Refills: 3 | Status: SHIPPED | OUTPATIENT
Start: 2023-02-07

## 2023-02-13 ENCOUNTER — OFFICE VISIT (OUTPATIENT)
Dept: CARDIOLOGY CLINIC | Age: 53
End: 2023-02-13
Payer: COMMERCIAL

## 2023-02-13 ENCOUNTER — OFFICE VISIT (OUTPATIENT)
Dept: FAMILY MEDICINE CLINIC | Age: 53
End: 2023-02-13

## 2023-02-13 VITALS
SYSTOLIC BLOOD PRESSURE: 126 MMHG | WEIGHT: 315 LBS | BODY MASS INDEX: 55.88 KG/M2 | OXYGEN SATURATION: 98 % | DIASTOLIC BLOOD PRESSURE: 78 MMHG | TEMPERATURE: 97.1 F | HEART RATE: 87 BPM

## 2023-02-13 VITALS
SYSTOLIC BLOOD PRESSURE: 126 MMHG | DIASTOLIC BLOOD PRESSURE: 78 MMHG | BODY MASS INDEX: 55.88 KG/M2 | HEART RATE: 88 BPM | WEIGHT: 315 LBS

## 2023-02-13 DIAGNOSIS — Z78.9 HEPATITIS B VACCINATION STATUS UNKNOWN: ICD-10-CM

## 2023-02-13 DIAGNOSIS — I10 ESSENTIAL HYPERTENSION: ICD-10-CM

## 2023-02-13 DIAGNOSIS — Z12.11 SCREEN FOR COLON CANCER: ICD-10-CM

## 2023-02-13 DIAGNOSIS — M10.9 ACUTE GOUT OF LEFT FOOT, UNSPECIFIED CAUSE: ICD-10-CM

## 2023-02-13 DIAGNOSIS — I42.0 DILATED CARDIOMYOPATHY (HCC): ICD-10-CM

## 2023-02-13 DIAGNOSIS — E78.49 OTHER HYPERLIPIDEMIA: ICD-10-CM

## 2023-02-13 DIAGNOSIS — E66.01 MORBID OBESITY DUE TO EXCESS CALORIES (HCC): ICD-10-CM

## 2023-02-13 DIAGNOSIS — Z23 NEED FOR TETANUS BOOSTER: ICD-10-CM

## 2023-02-13 DIAGNOSIS — Z23 NEED FOR PNEUMOCOCCAL VACCINATION: ICD-10-CM

## 2023-02-13 DIAGNOSIS — I50.42 CHRONIC COMBINED SYSTOLIC AND DIASTOLIC HEART FAILURE (HCC): ICD-10-CM

## 2023-02-13 DIAGNOSIS — I50.42 CHRONIC COMBINED SYSTOLIC AND DIASTOLIC HEART FAILURE (HCC): Primary | ICD-10-CM

## 2023-02-13 DIAGNOSIS — E11.65 TYPE 2 DIABETES MELLITUS WITH HYPERGLYCEMIA, WITHOUT LONG-TERM CURRENT USE OF INSULIN (HCC): Primary | ICD-10-CM

## 2023-02-13 LAB — HBA1C MFR BLD: 7.5 %

## 2023-02-13 PROCEDURE — 3078F DIAST BP <80 MM HG: CPT | Performed by: NURSE PRACTITIONER

## 2023-02-13 PROCEDURE — G8484 FLU IMMUNIZE NO ADMIN: HCPCS | Performed by: NURSE PRACTITIONER

## 2023-02-13 PROCEDURE — G8427 DOCREV CUR MEDS BY ELIG CLIN: HCPCS | Performed by: NURSE PRACTITIONER

## 2023-02-13 PROCEDURE — 3017F COLORECTAL CA SCREEN DOC REV: CPT | Performed by: NURSE PRACTITIONER

## 2023-02-13 PROCEDURE — 99214 OFFICE O/P EST MOD 30 MIN: CPT | Performed by: NURSE PRACTITIONER

## 2023-02-13 PROCEDURE — 1036F TOBACCO NON-USER: CPT | Performed by: NURSE PRACTITIONER

## 2023-02-13 PROCEDURE — G8417 CALC BMI ABV UP PARAM F/U: HCPCS | Performed by: NURSE PRACTITIONER

## 2023-02-13 PROCEDURE — 93000 ELECTROCARDIOGRAM COMPLETE: CPT | Performed by: NURSE PRACTITIONER

## 2023-02-13 PROCEDURE — 3074F SYST BP LT 130 MM HG: CPT | Performed by: NURSE PRACTITIONER

## 2023-02-13 SDOH — ECONOMIC STABILITY: HOUSING INSECURITY
IN THE LAST 12 MONTHS, WAS THERE A TIME WHEN YOU DID NOT HAVE A STEADY PLACE TO SLEEP OR SLEPT IN A SHELTER (INCLUDING NOW)?: NO

## 2023-02-13 SDOH — ECONOMIC STABILITY: INCOME INSECURITY: HOW HARD IS IT FOR YOU TO PAY FOR THE VERY BASICS LIKE FOOD, HOUSING, MEDICAL CARE, AND HEATING?: SOMEWHAT HARD

## 2023-02-13 SDOH — ECONOMIC STABILITY: FOOD INSECURITY: WITHIN THE PAST 12 MONTHS, THE FOOD YOU BOUGHT JUST DIDN'T LAST AND YOU DIDN'T HAVE MONEY TO GET MORE.: SOMETIMES TRUE

## 2023-02-13 SDOH — ECONOMIC STABILITY: FOOD INSECURITY: WITHIN THE PAST 12 MONTHS, YOU WORRIED THAT YOUR FOOD WOULD RUN OUT BEFORE YOU GOT MONEY TO BUY MORE.: SOMETIMES TRUE

## 2023-02-13 ASSESSMENT — PATIENT HEALTH QUESTIONNAIRE - PHQ9
SUM OF ALL RESPONSES TO PHQ QUESTIONS 1-9: 0
SUM OF ALL RESPONSES TO PHQ9 QUESTIONS 1 & 2: 0
SUM OF ALL RESPONSES TO PHQ QUESTIONS 1-9: 0
1. LITTLE INTEREST OR PLEASURE IN DOING THINGS: 0
SUM OF ALL RESPONSES TO PHQ QUESTIONS 1-9: 0
2. FEELING DOWN, DEPRESSED OR HOPELESS: 0
SUM OF ALL RESPONSES TO PHQ QUESTIONS 1-9: 0

## 2023-02-13 ASSESSMENT — ENCOUNTER SYMPTOMS: SHORTNESS OF BREATH: 1

## 2023-02-13 NOTE — PROGRESS NOTES
CC CHF    HPI:  47 y.o. patient of Dr Cj Zhong with chronic systolic CHF (EF 08-04% was 20-25%), HTN, obesity, CKD and DM. Has a full time job driving. Notes pitting LE edema. Continues to have RODRIGUEZ. Tries to limit salt intake . He is going to try compression stockings    He denies cp, LH/dizziness, palpitations, syncope or falls. No orthopnea, PND, abdominal bloating or early satiety. No n/v/d, fever or GI/ bleeding. Past Medical History:   Diagnosis Date    CHF (congestive heart failure) (HCC)     Diabetes mellitus (HCC)     HLD (hyperlipidemia)     Hypertension      Past Surgical History:   Procedure Laterality Date    KNEE SURGERY Right     ACL, PCL tear     Family History   Problem Relation Age of Onset    Diabetes type 2  Mother     Heart Failure Mother     Diabetes type 2  Father     Heart Failure Father      Social History     Tobacco Use    Smoking status: Former     Types: Cigars     Quit date: 2009     Years since quittin.1    Smokeless tobacco: Never   Vaping Use    Vaping Use: Never used   Substance Use Topics    Alcohol use: Not Currently     Comment: occas    Drug use: No     Allergies:Lisinopril    Review of Systems  General: No changes in weight, or night sweats. +weight gain   HEENT: No blurry or decreased vision. No changes in hearing, nasal discharge or sore throat. Cardiovascular:  See HPI. Respiratory: No cough, hemoptysis, or wheezing. Gastrointestinal:  No abdominal pain, hematochezia, melana, constipation, diarrhea, or history of GI ulcers. Genito-Urinary: No dysuria or hematuria. No urgency or polyuria. + ED  Musculoskeletal:  No complaints of joint pain, joint swelling or muscular weakness/soreness. Neurological:  No dizziness, headaches, numbness/tingling, speech problems or weakness. Psychological:  No anxiety or depression. Hematological and Lymphatic: No abnormal bleeding or bruising, blood clots, jaundice or swollen lymph nodes.   Endocrine:   No malaise/lethargy, palpitations, polydipsia/polyuria, temperature intolerance or unexpected weight changes  Skin:  No rashes or non-healing ulcers. Physical Exam:  /78   Pulse 88   Wt (!) 412 lb (186.9 kg)   BMI 55.88 kg/m²    General (appearance):  No acute distress. obese  Eyes: anicteric   Neck: soft, No JVD  Ears/Nose/Mouth/Thorat: No cyanosis  CV: RRR  Respiratory:  Diminished diffusely. No wheeze or rales. normal effort  GI: soft, non-tender, non-distended  Skin: Warm, dry. No rashes  Neuro/Psych: Alert and oriented x 3.  Appropriate behavior  Ext:  No c/c. +pitting BLE edema  Pulses:  2+ radial     Weight  Wt Readings from Last 3 Encounters:   02/13/23 (!) 412 lb (186.9 kg)   09/27/22 (!) 409 lb (185.5 kg)   08/18/22 (!) 419 lb 6.4 oz (190.2 kg)          CBC:   Lab Results   Component Value Date    WBC 9.4 04/07/2020    HGB 12.0 (L) 04/07/2020    HCT 35.6 (L) 04/07/2020    MCV 93.8 04/07/2020     04/07/2020     BMP:  Lab Results   Component Value Date    CREATININE 1.8 (H) 04/07/2020    BUN 74 (H) 04/07/2020     (L) 04/07/2020    K 5.0 04/07/2020    CL 88 (L) 04/07/2020    CO2 22 04/07/2020     Mag:   Lab Results   Component Value Date/Time    MG 2.00 10/25/2018 05:23 AM     LIVER PROFILE:   Lab Results   Component Value Date    ALT 20 10/24/2018    AST 28 10/24/2018    ALKPHOS 85 10/24/2018    BILITOT 0.3 10/24/2018     PT/INR:   Lab Results   Component Value Date    INR 1.02 12/01/2009    PROTIME 11.4 12/01/2009     Pro-BNP   Lab Results   Component Value Date/Time    PROBNP 45 04/06/2020 12:48 PM    PROBNP 101 03/17/2020 10:50 AM    PROBNP 95 01/10/2019 04:31 PM     LIPIDS:  No components found for: CHLPL  Lab Results   Component Value Date    TRIG 238 (H) 10/25/2018    TRIG 156 (H) 11/01/2012     Lab Results   Component Value Date    HDL 43 10/25/2018    HDL 39 11/01/2012     Lab Results   Component Value Date    LDLCALC 153 (H) 10/25/2018    LDLCALC 110 11/01/2012     Lab Results Component Value Date    LABVLDL 48 10/25/2018     TSH:  Lab Results   Component Value Date    TSH 1.36 2012       [id number] ECG: Sinus     3/31/2022 Echo   Left ventricular cavity size is normal.   There is moderate concentric left ventricular hypertrophy. Left ventricular function is reduced with ejection fraction estimated at   35-40%. Global left ventricular hypokinesis is present. Indeterminate diastolic function. The aortic root is dilated measuring 4.4 cm.     Echo:   Technically difficult examination with Definity. Mild concentric left ventricular hypertrophy. The left ventricle appears at the upper limits of normal in size. Globally mildly reduced left ventricular systolic function with an estimated   ejection fraction of 40-45%. The aortic root is dilated at 4.2 cm.  Echo:   Left ventricular cavity size is dilated. There is moderate concentric left   ventricular hypertrophy. Overall left ventricular systolic function appears severely reduced with an   ejection fraction visually estimated at 20-25%. Global hypokinesis of the left ventricle. Diastolic filling parameters suggest grade III diastolic dysfunction. Mild mitral regurgitation is present. Trivial tricuspid regurgitation. Estimated pulmonary artery systolic pressure is elevated at 52 mmHg assuming   a right atrial pressure of 15mmHg. Biatrial enlargement. The right ventricle is enlarged and hypokinetic. TAPSE 1.2 cm   RV S velocity 9.3 cm/s   The aortic root is dilated at 4.1 cm.      [facility]  RESULTS OF THE PROCEDURE ARE AS FOLLOWS-   1. Left main normal.   2.  LAD normal.   3.  Circumflex normal.   4.  Right coronary artery dominant, normal.   5.  LV angiography demonstrates low normal EF of 50%. 6.  LVEDP of 25.   7.  Aortic pressure 130/98. 8.  LV pressure 130/25.        Medications:   Current Outpatient Medications   Medication Sig Dispense Refill    torsemide (DEMADEX) 100 MG tablet Take 0.5 tablets by mouth daily 90 tablet 3    sildenafil (VIAGRA) 25 MG tablet TAKE ONE TABLET BY MOUTH AS NEEDED FOR ERECTILE DYSFUNCTION 30 tablet 5    metFORMIN (GLUCOPHAGE) 500 MG tablet Take 2 tablets by mouth 2 times daily (with meals) 120 tablet 5    metoprolol succinate (TOPROL XL) 25 MG extended release tablet Take 0.5 tablets by mouth daily 45 tablet 3    hydroCHLOROthiazide (HYDRODIURIL) 25 MG tablet TAKE 1 TABLET BY MOUTH EVERY DAY 30 tablet 11    valsartan (DIOVAN) 80 MG tablet Take 1 tablet by mouth daily 90 tablet 3    Continuous Blood Gluc Sensor (FREESTYLE LINDA 2 SENSOR) MISC 1 Device by Does not apply route every 14 days 8 each 5     No current facility-administered medications for this visit. Assessment:  1. Chronic combined systolic and diastolic heart failure ([address]    2. Dilated cardiomyopathy ([facility] [address] [address]    3. Essential hypertension            Plan:  Chronic HFrEF; dilated CMP: stable   Compression stockings   Low salt diet, 2 liter fluid restrictions, daily weights    Discussed s/s decompensated HF   Torsemide 50 mg po daily. Encouraged pt to increase to 100 mg po daily or at least PRN for weight gain and edema and sob    Valsartan   Toprol    HTN: stable    /78   Toprol    HCTZ   Valsartan     Labs drawn today (CBC, CMP, Lipids)    Follow up in 6 month    Pt resistant to taking medications. Prefers homeopathic or herbal medications. Agreeable to take valsartan.  Toprol and torsemide     Reviewed most recent: CBC, BMP, LFT, Lipids, Mag, PT/INR, BNP, TSH  Reviewed most recent: ECG, Echo, LHC

## 2023-02-13 NOTE — PROGRESS NOTES
Estelle Hair (:  1970) is a 47 y.o. male,Established patient, here for evaluation of the following chief complaint(s):  Diabetes      ASSESSMENT/PLAN:  1. Type 2 diabetes mellitus with hyperglycemia, without long-term current use of insulin (HCC)  -     POCT glycosylated hemoglobin (Hb A1C)  -     MICROALBUMIN / CREATININE URINE RATIO; Future  2. Acute gout of left foot, unspecified cause  -     Uric Acid; Future  3. Essential hypertension  Assessment & Plan:   Well-controlled, continue current medications  Orders:  -     CBC with Auto Differential; Future  -     Comprehensive Metabolic Panel; Future  4. Other hyperlipidemia  -     LIPID PANEL; Future  5. Screen for colon cancer  -     Fecal DNA Colorectal cancer screening (Cologuard)  6. Hepatitis B vaccination status unknown  -     Hepatitis B Core Antibody, Total; Future  7. Need for tetanus booster  -     Tdap, BOOSTRIX, (age 8 yrs+), IM  8. Need for pneumococcal vaccination  -     Pneumococcal, PCV20, PREVNAR 21, (age 25 yrs+), IM, PF  9. Morbid obesity due to excess calories [facility])  Assessment & Plan:  Enc better choices. He is not ready to change  10. Dilated cardiomyopathy ([facility] [address]  Assessment & Plan:   Monitored by specialist- no acute findings meriting change in the plan  11. Chronic combined systolic and diastolic heart failure [facility])  Assessment & Plan:   Monitored by specialist- no acute findings meriting change in the plan      Return in about 3 months (around 2023). SUBJECTIVE/OBJECTIVE:  Cont to work as a . Doesn't have room in car for a lunchbox so gets fast food- whatever he wants to eat. Drinks pop. Knows this is bad. Knows he is to heavy and it is unhealthy. Likes fast food. Having recurrent LE swelling. Blames demodex no longer working. Wont wear comp hose  Feels mild SOB with activity. Baseline. Doesn't like to check his glucose. Only on Metformin. Concern for CHF with actos.     Current Outpatient Medications Medication Sig Dispense Refill    torsemide (DEMADEX) 100 MG tablet Take 0.5 tablets by mouth daily 90 tablet 3    sildenafil (VIAGRA) 25 MG tablet TAKE ONE TABLET BY MOUTH AS NEEDED FOR ERECTILE DYSFUNCTION 30 tablet 5    metFORMIN (GLUCOPHAGE) 500 MG tablet Take 2 tablets by mouth 2 times daily (with meals) 120 tablet 5    metoprolol succinate (TOPROL XL) 25 MG extended release tablet Take 0.5 tablets by mouth daily 45 tablet 3    hydroCHLOROthiazide (HYDRODIURIL) 25 MG tablet TAKE 1 TABLET BY MOUTH EVERY DAY 30 tablet 11    valsartan (DIOVAN) 80 MG tablet Take 1 tablet by mouth daily 90 tablet 3    Continuous Blood Gluc Sensor (FREESTYLE LINDA 2 SENSOR) MISC 1 Device by Does not apply route every 14 days 8 each 5     No current facility-administered medications for this visit. Review of Systems   Respiratory:  Positive for shortness of breath. Cardiovascular:  Positive for leg swelling. Neurological:         Feet tingling with pain and numbness   All other systems reviewed and are negative. Vitals:    02/13/23 1133   BP: 126/78   Site: Left Upper Arm   Position: Sitting   Cuff Size: Medium Adult   Pulse: 87   Temp: 97.1 °F (36.2 °C)   SpO2: 98%   Weight: (!) 412 lb (186.9 kg)       Physical Exam  Constitutional:       Appearance: Normal appearance. He is well-developed. He is obese. HENT:      Head: Normocephalic. Mouth/Throat:      Mouth: Mucous membranes are moist.   Eyes:      Pupils: Pupils are equal, round, and reactive to light. Cardiovascular:      Rate and Rhythm: Normal rate and regular rhythm. Pulmonary:      Effort: Pulmonary effort is normal.      Breath sounds: Normal breath sounds. Musculoskeletal:         General: Normal range of motion. Cervical back: Normal range of motion. Skin:     General: Skin is warm and dry. Neurological:      Mental Status: He is alert and oriented to person, place, and time.                An electronic signature was used to authenticate this note.     --Artemio Figueroa, DEBBIE - CNP

## 2023-02-14 RX ORDER — ATORVASTATIN CALCIUM 20 MG/1
20 TABLET, FILM COATED ORAL DAILY
Qty: 90 TABLET | Refills: 1 | Status: SHIPPED | OUTPATIENT
Start: 2023-02-14

## 2023-02-17 DIAGNOSIS — E11.65 TYPE 2 DIABETES MELLITUS WITH HYPERGLYCEMIA, WITHOUT LONG-TERM CURRENT USE OF INSULIN (HCC): Primary | ICD-10-CM

## 2023-02-21 ENCOUNTER — TELEPHONE (OUTPATIENT)
Dept: FAMILY MEDICINE CLINIC | Age: 53
End: 2023-02-21

## 2023-02-21 ENCOUNTER — TELEPHONE (OUTPATIENT)
Dept: CARDIOLOGY CLINIC | Age: 53
End: 2023-02-21

## 2023-02-21 NOTE — LETTER
[address] [facility] [address] [place] [address]  Phone: [phone]  Fax: [phone]    DEBBIE Johnson CNP        February 21, 2023    [address] [facility] [address]  [address] [facility]      To whom it may concern, Neelam Núñez has been under my care since 2020 for management of his chronic medical conditions. This includes but not limited to Dilated Cardiomyopathy. Congestive Heart Failure, Uncontrolled diabetes, Morbid Obesity, Gout. He will require accommodative housing. If you have any questions or concerns, please don't hesitate to call.     Sincerely,        DEBBIE Johnson CNP

## 2023-02-21 NOTE — LETTER
[address] [facility] [address]  Phone: [phone]  Fax: [phone]    DEBBIE Nazario CNP        February 22, 2023     Patient: Christa Gee   YOB: 1970   Date of Visit: 2/21/2023       To Whom It May Concern:    Mr wEa Aden is a 47 y.o. patient of Dr Luis Toussaint with chronic systolic heart failure, hypertension, obesity, chronic kidney disease and diabetes. He continues to struggles with shortness of breath and edema. He is requesting a letter to aid in his moving to senior living for further assistance. Unclear what information is needed please call my if I can be of further assistance.       Sincerely,        DEBBIE Nazario CNP

## 2023-02-21 NOTE — TELEPHONE ENCOUNTER
He is moving into a senior home and needs a letter and     Is not of age so he would need this explaining why he     Is in need of this. He would like to have this emailed by Friday if at all possible.     [email or web address]    #[phone]

## 2023-02-21 NOTE — TELEPHONE ENCOUNTER
Pt called and stated that he needs a letter for his apartments stating:    His medical condition/exemption for housing and that the pt has been seeing Gil Swartz for over a year for this condition. Please email to pt at: [email or web address]    If possible, please have this ready by 11:00am Friday. Please call pt when letter has been emailed.

## 2023-02-22 ENCOUNTER — TELEPHONE (OUTPATIENT)
Dept: CARDIOLOGY CLINIC | Age: 53
End: 2023-02-22

## 2023-02-24 ENCOUNTER — TELEPHONE (OUTPATIENT)
Dept: FAMILY MEDICINE CLINIC | Age: 53
End: 2023-02-24

## 2023-02-24 RX ORDER — INDOMETHACIN 50 MG/1
50 CAPSULE ORAL 3 TIMES DAILY
Qty: 30 CAPSULE | Refills: 1 | Status: SHIPPED | OUTPATIENT
Start: 2023-02-24

## 2023-02-24 RX ORDER — ALLOPURINOL 100 MG/1
100 TABLET ORAL DAILY
Qty: 90 TABLET | Refills: 1 | Status: SHIPPED | OUTPATIENT
Start: 2023-02-24

## 2023-02-24 NOTE — TELEPHONE ENCOUNTER
Sent in allopurinol to take daily. This would not help  much with his flare right now. Prescription for indomethacin as well to take during his flare.   He can stop the indomethacin once the flare is resolved and continue to take allopurinol daily

## 2023-02-24 NOTE — TELEPHONE ENCOUNTER
Pt is having a gout flare up  Can't even put on a shoe  His ankle is swollen and now his fingers are swelling   Moody Honeycutt is out of town and he would need to be seen  Asking if allopurinol can be sent in to    [facility] [id number] [place], [place] - [address]

## 2023-04-24 ENCOUNTER — TELEPHONE (OUTPATIENT)
Dept: FAMILY MEDICINE CLINIC | Age: 53
End: 2023-04-24

## 2023-04-24 RX ORDER — INDOMETHACIN 50 MG/1
50 CAPSULE ORAL 3 TIMES DAILY
Qty: 30 CAPSULE | Refills: 1 | Status: SHIPPED | OUTPATIENT
Start: 2023-04-24

## 2023-04-24 RX ORDER — ALLOPURINOL 100 MG/1
100 TABLET ORAL DAILY
Qty: 90 TABLET | Refills: 1 | Status: SHIPPED | OUTPATIENT
Start: 2023-04-24

## 2023-04-24 NOTE — TELEPHONE ENCOUNTER
----- Message from Mackenzie Vallejo sent at 4/24/2023  8:16 AM EDT -----  Subject: Message to Provider    QUESTIONS  Information for Provider? Pt had a refill request this morning. He is   asking that it be expediated and to notify him when it has been called   into pharmacy.  ---------------------------------------------------------------------------  --------------  [email or web address]  [phone]; OK to leave message on voicemail  ---------------------------------------------------------------------------  --------------  SCRIPT ANSWERS  Relationship to Patient?  Self

## 2023-04-24 NOTE — TELEPHONE ENCOUNTER
----- Message from Latoya Valle sent at 4/24/2023  8:15 AM EDT -----  Subject: Refill Request    QUESTIONS  Name of Medication? allopurinol (ZYLOPRIM) 100 MG tablet  Patient-reported dosage and instructions? Take 1 tablet by mouth daily  How many days do you have left? 0  Preferred [facility]acy? [facility] [id number]  Pharmacy phone number (if available)? [phone]  Additional Information for Provider? Pt has a flare up gout and cannot   walk.  ---------------------------------------------------------------------------  --------------,  Name of Medication? indomethacin (INDOCIN) 50 MG capsule  Patient-reported dosage and instructions? Take 1 capsule by mouth 3 times   daily  How many days do you have left? 0  Preferred Pharmacy? [facility] [id number]  Pharmacy phone number (if available)? [phone]  Additional Information for Provider? Pt has a flare up gout and cannot   walk.  ---------------------------------------------------------------------------  --------------  CALL BACK INFO  What is the best way for the office to contact you? OK to leave message on   voicemail  Preferred Call Back Phone Number? [phone]  ---------------------------------------------------------------------------  --------------  SCRIPT ANSWERS  Relationship to Patient?  Self

## 2023-04-26 ENCOUNTER — HOSPITAL ENCOUNTER (EMERGENCY)
Age: 53
Discharge: HOME | End: 2023-04-26
Attending: EMERGENCY MEDICINE
Payer: COMMERCIAL

## 2023-04-26 VITALS
OXYGEN SATURATION: 92 % | RESPIRATION RATE: 20 BRPM | DIASTOLIC BLOOD PRESSURE: 110 MMHG | SYSTOLIC BLOOD PRESSURE: 169 MMHG | HEART RATE: 101 BPM | TEMPERATURE: 98.4 F

## 2023-04-26 DIAGNOSIS — M10.9 ACUTE GOUT OF RIGHT FOOT, UNSPECIFIED CAUSE: Primary | ICD-10-CM

## 2023-04-26 PROCEDURE — 99283 EMERGENCY DEPT VISIT LOW MDM: CPT

## 2023-04-26 PROCEDURE — 6370000000 HC RX 637 (ALT 250 FOR IP): Performed by: PHYSICIAN ASSISTANT

## 2023-04-26 RX ORDER — KETOROLAC TROMETHAMINE 30 MG/ML
15 INJECTION, SOLUTION INTRAMUSCULAR; INTRAVENOUS ONCE
Status: DISCONTINUED | OUTPATIENT
Start: 2023-04-26 | End: 2023-04-26 | Stop reason: HOSPADM

## 2023-04-26 RX ORDER — OXYCODONE HYDROCHLORIDE 5 MG/1
5 TABLET ORAL EVERY 8 HOURS PRN
Qty: 9 TABLET | Refills: 0 | Status: SHIPPED | OUTPATIENT
Start: 2023-04-26 | End: 2023-04-26 | Stop reason: SDUPTHER

## 2023-04-26 RX ORDER — OXYCODONE HYDROCHLORIDE 5 MG/1
5 TABLET ORAL EVERY 8 HOURS PRN
Qty: 9 TABLET | Refills: 0 | Status: SHIPPED | OUTPATIENT
Start: 2023-04-26 | End: 2023-04-29

## 2023-04-26 RX ORDER — VALSARTAN 80 MG/1
80 TABLET ORAL ONCE
Status: COMPLETED | OUTPATIENT
Start: 2023-04-26 | End: 2023-04-26

## 2023-04-26 RX ORDER — METOPROLOL SUCCINATE 25 MG/1
25 TABLET, EXTENDED RELEASE ORAL ONCE
Status: COMPLETED | OUTPATIENT
Start: 2023-04-26 | End: 2023-04-26

## 2023-04-26 RX ORDER — TORSEMIDE 100 MG/1
100 TABLET ORAL ONCE
Status: DISCONTINUED | OUTPATIENT
Start: 2023-04-26 | End: 2023-04-26

## 2023-04-26 RX ORDER — OXYCODONE HYDROCHLORIDE 5 MG/1
5 TABLET ORAL ONCE
Status: COMPLETED | OUTPATIENT
Start: 2023-04-26 | End: 2023-04-26

## 2023-04-26 RX ORDER — HYDROCHLOROTHIAZIDE 25 MG/1
25 TABLET ORAL ONCE
Status: COMPLETED | OUTPATIENT
Start: 2023-04-26 | End: 2023-04-26

## 2023-04-26 RX ADMIN — HYDROCHLOROTHIAZIDE 25 MG: 25 TABLET ORAL at 14:13

## 2023-04-26 RX ADMIN — OXYCODONE HYDROCHLORIDE 5 MG: 5 TABLET ORAL at 13:26

## 2023-04-26 RX ADMIN — TORSEMIDE 50 MG: 20 TABLET ORAL at 14:32

## 2023-04-26 RX ADMIN — VALSARTAN 80 MG: 80 TABLET, FILM COATED ORAL at 14:33

## 2023-04-26 RX ADMIN — METOPROLOL SUCCINATE 25 MG: 25 TABLET, EXTENDED RELEASE ORAL at 14:13

## 2023-04-26 ASSESSMENT — PAIN DESCRIPTION - PAIN TYPE: TYPE: ACUTE PAIN

## 2023-04-26 ASSESSMENT — PAIN - FUNCTIONAL ASSESSMENT
PAIN_FUNCTIONAL_ASSESSMENT: PREVENTS OR INTERFERES WITH MANY ACTIVE NOT PASSIVE ACTIVITIES
PAIN_FUNCTIONAL_ASSESSMENT: 0-10

## 2023-04-26 ASSESSMENT — PAIN DESCRIPTION - ONSET: ONSET: PROGRESSIVE

## 2023-04-26 ASSESSMENT — ENCOUNTER SYMPTOMS
CHEST TIGHTNESS: 0
SHORTNESS OF BREATH: 0
NAUSEA: 0
VOMITING: 0
ABDOMINAL PAIN: 0

## 2023-04-26 ASSESSMENT — PAIN DESCRIPTION - FREQUENCY: FREQUENCY: CONTINUOUS

## 2023-04-26 ASSESSMENT — PAIN DESCRIPTION - ORIENTATION: ORIENTATION: RIGHT

## 2023-04-26 ASSESSMENT — PAIN DESCRIPTION - LOCATION: LOCATION: FOOT

## 2023-04-26 ASSESSMENT — PAIN DESCRIPTION - DESCRIPTORS: DESCRIPTORS: SHARP

## 2023-04-26 ASSESSMENT — PAIN SCALES - GENERAL
PAINLEVEL_OUTOF10: 5
PAINLEVEL_OUTOF10: 7

## 2023-04-26 NOTE — DISCHARGE INSTRUCTIONS
You were given a short course of oxycodone to take for the next several days to help with your gout flare. Please continue taking the allopurinol and indomethacin you were prescribed for your gout flare. Contact your primary care provider to discuss outpatient follow-up. Return to the emergency department for chest pain, shortness of breath, or other concerning symptoms.

## 2023-04-26 NOTE — ED PROVIDER NOTES
ED Attending Attestation Note     Date of evaluation: 2/23/2023    This patient was seen by the advance practice provider. I have seen and examined the patient, agree with the workup, evaluation, management and diagnosis. The care plan has been discussed. Patient History     Chief Complaint: Foot Pain (Right foot pain. Hx of gout. Pt has been out of medicines for gout and blood pressure. )      HPI: Ethan Colby is a 47 y.o. who presents to the Emergency Department with complaints of pain and swelling of the right foot, which feels consistent to him with previous gout flares. The patient has been experiencing pain and swelling in the foot and ankle for the last several days, after having been out of his allopurinol, and all of his other home medications, for some time. His primary care provider has called in prescriptions for allopurinol and indomethacin, which he has been taking for the last 2 days, but his symptoms are not markedly improved, and he presents today due to ongoing pain. He does feel that his legs in general are more swollen than baseline, and he feels that this is because he is out of his diuretic, which is torsemide. He is notably hypertensive on triage, which she attributes to being out of his blood pressure medicines. However, he denies chest pain or shortness of breath. The patient states that he has refills of his prescriptions available to his pharmacy, but at the moment does not have the financial means to pick them up, as his income was affected by a short workweek due to spring break. He he will be paid at the beginning of the month, but states that he does not have any money to  prescriptions until then. He has a past medical history of CHF (congestive heart failure) ([facility] [address] Diabetes mellitus ([facility] [address] HLD (hyperlipidemia), and Hypertension. He has a past surgical history that includes knee surgery (Right, 1997).     family history includes Diabetes type 2  in his

## 2023-04-26 NOTE — ED TRIAGE NOTES
Chief Complaint   Patient presents with    Foot Pain     Right foot pain. Hx of gout. Pt has been out of medicines for gout and blood pressure.

## 2023-04-26 NOTE — ED PROVIDER NOTES
[address] [facility] [address] ENCOUNTER          PHYSICIAN ASSISTANT NOTE       Date of evaluation: 4/26/2023    Chief Complaint     Foot Pain (Right foot pain. Hx of gout. Pt has been out of medicines for gout and blood pressure. )      History of Present Illness     Ezio Mccarthy is a 47 y.o. male who presents to the emergency room with complaints of right foot and ankle pain and swelling. He states that he has been out of his gout medication for approximately 1 week. 4 days ago he began to notice pain and swelling to his right foot and ankle consistent with gout exacerbation as he has had in the past.  He contacted his primary care provider who did refill his medications and he began taking allopurinol and indomethacin 2 days ago. He states that typically his gout flares improve with this medication, however it has not. He is still able to ambulate, however it is painful. He denies any trauma or injury. Of note, he states that he has been out of all of his blood pressure and other medications as well. He states that there is a refill at the pharmacy, however he is unable to afford the medication at this point. He states he does get paid at the beginning of next month and plans on getting the medications filled them. He denies any chest pain, shortness of breath, abdominal pain, nausea, vomiting, or other concerning symptoms. ASSESSMENT / PLAN  (MEDICAL DECISION MAKING)     INITIAL VITALS: BP: (!) 166/110, Temp: 98.4 °F (36.9 °C), Heart Rate: (!) 101, Resp: 20, SpO2: 95 %    Ezio Mccarthy is a 47 y.o. male who presents to the emergency room with right foot and ankle pain. .  Patient was hypertensive on presentation, however has not taken any of his home medications. Remainder vital signs stable. Thorough history and physical performed and detailed above. Patient presents the emergency department complaints of 4 days of right foot and ankle pain consistent with previous gout flares.

## 2023-05-16 ENCOUNTER — OFFICE VISIT (OUTPATIENT)
Dept: FAMILY MEDICINE CLINIC | Age: 53
End: 2023-05-16
Payer: COMMERCIAL

## 2023-05-16 VITALS
SYSTOLIC BLOOD PRESSURE: 138 MMHG | WEIGHT: 315 LBS | HEART RATE: 98 BPM | BODY MASS INDEX: 55.33 KG/M2 | TEMPERATURE: 97.3 F | OXYGEN SATURATION: 99 % | DIASTOLIC BLOOD PRESSURE: 86 MMHG

## 2023-05-16 DIAGNOSIS — E11.65 TYPE 2 DIABETES MELLITUS WITH HYPERGLYCEMIA, WITHOUT LONG-TERM CURRENT USE OF INSULIN (HCC): ICD-10-CM

## 2023-05-16 DIAGNOSIS — I42.0 DILATED CARDIOMYOPATHY (HCC): ICD-10-CM

## 2023-05-16 DIAGNOSIS — I10 ESSENTIAL HYPERTENSION: ICD-10-CM

## 2023-05-16 DIAGNOSIS — E66.01 MORBID OBESITY DUE TO EXCESS CALORIES (HCC): Primary | ICD-10-CM

## 2023-05-16 PROBLEM — M10.9 ACUTE GOUT OF LEFT FOOT: Status: RESOLVED | Noted: 2020-07-21 | Resolved: 2023-05-16

## 2023-05-16 PROCEDURE — 3079F DIAST BP 80-89 MM HG: CPT | Performed by: NURSE PRACTITIONER

## 2023-05-16 PROCEDURE — 3075F SYST BP GE 130 - 139MM HG: CPT | Performed by: NURSE PRACTITIONER

## 2023-05-16 PROCEDURE — G8427 DOCREV CUR MEDS BY ELIG CLIN: HCPCS | Performed by: NURSE PRACTITIONER

## 2023-05-16 PROCEDURE — 99214 OFFICE O/P EST MOD 30 MIN: CPT | Performed by: NURSE PRACTITIONER

## 2023-05-16 PROCEDURE — G8417 CALC BMI ABV UP PARAM F/U: HCPCS | Performed by: NURSE PRACTITIONER

## 2023-05-16 PROCEDURE — 2022F DILAT RTA XM EVC RTNOPTHY: CPT | Performed by: NURSE PRACTITIONER

## 2023-05-16 PROCEDURE — 1036F TOBACCO NON-USER: CPT | Performed by: NURSE PRACTITIONER

## 2023-05-16 PROCEDURE — 3051F HG A1C>EQUAL 7.0%<8.0%: CPT | Performed by: NURSE PRACTITIONER

## 2023-05-16 PROCEDURE — 3017F COLORECTAL CA SCREEN DOC REV: CPT | Performed by: NURSE PRACTITIONER

## 2023-05-16 RX ORDER — HYDROCHLOROTHIAZIDE 25 MG/1
25 TABLET ORAL DAILY
Qty: 30 TABLET | Refills: 11 | Status: SHIPPED | OUTPATIENT
Start: 2023-05-16

## 2023-05-16 RX ORDER — METOPROLOL SUCCINATE 25 MG/1
12.5 TABLET, EXTENDED RELEASE ORAL DAILY
Qty: 45 TABLET | Refills: 3 | Status: SHIPPED | OUTPATIENT
Start: 2023-05-16

## 2023-05-16 RX ORDER — INDOMETHACIN 50 MG/1
50 CAPSULE ORAL 3 TIMES DAILY
Qty: 30 CAPSULE | Refills: 1 | Status: SHIPPED | OUTPATIENT
Start: 2023-05-16

## 2023-05-16 RX ORDER — ALLOPURINOL 100 MG/1
100 TABLET ORAL DAILY
Qty: 90 TABLET | Refills: 1 | Status: SHIPPED | OUTPATIENT
Start: 2023-05-16

## 2023-05-16 ASSESSMENT — ENCOUNTER SYMPTOMS
ABDOMINAL PAIN: 0
STRIDOR: 0
COUGH: 0
DIARRHEA: 0
CONSTIPATION: 0
NAUSEA: 0
SINUS PRESSURE: 0
CHEST TIGHTNESS: 0
RHINORRHEA: 0
EYE REDNESS: 0
BACK PAIN: 0
SHORTNESS OF BREATH: 0
ABDOMINAL DISTENTION: 0
WHEEZING: 0
EYE PAIN: 0
TROUBLE SWALLOWING: 0
EYE DISCHARGE: 0
EYE ITCHING: 0
SINUS PAIN: 0
VOMITING: 0

## 2023-05-16 NOTE — PROGRESS NOTES
rhythm. Heart sounds: Normal heart sounds. Pulmonary:      Effort: Pulmonary effort is normal.      Breath sounds: Normal breath sounds. Musculoskeletal:         General: Normal range of motion. Cervical back: Normal range of motion. Skin:     General: Skin is warm and dry. Neurological:      Mental Status: He is alert and oriented to person, place, and time. An electronic signature was used to authenticate this note.     --DEBBIE Cox - CNP

## 2023-05-16 NOTE — ASSESSMENT & PLAN NOTE
Uncontrolled, continue current medications and lifestyle modifications recommended   Challenged to lose 15# in next 3 months with simple diet change, increase exercise, and hold all salt

## 2023-05-16 NOTE — ASSESSMENT & PLAN NOTE
Lab Results   Component Value Date    LABA1C 7.5 02/13/2023    LABA1C 8.1 09/27/2022    LABA1C 10.2 08/15/2022    LABA1C 10.7 05/26/2021    LABA1C 10.2 09/16/2020   Improving, restart metformin

## 2023-09-19 ENCOUNTER — TELEPHONE (OUTPATIENT)
Dept: FAMILY MEDICINE CLINIC | Age: 53
End: 2023-09-19

## 2023-09-19 RX ORDER — VALSARTAN 80 MG/1
80 TABLET ORAL DAILY
Qty: 90 TABLET | Refills: 3 | Status: SHIPPED | OUTPATIENT
Start: 2023-09-19

## 2023-09-19 NOTE — TELEPHONE ENCOUNTER
Pt is requesting a refill.     OV 5-16-23      valsartan (DIOVAN) 80 MG tablet [[id number]]     Order Details  Dose: 80 mg Route: Oral Frequency: DAILY   Dispense Quantity: 90 tablet Refills: 3          Sig: Take 1 tablet by mouth daily         Start Date: 08/18/22 End Date: --   Written Date: 08/18/22 Expiration Date: 08/18/23   Original Order:  valsartan (DIOVAN) 80 MG tablet [[id number]]   Providers    Authorizing Provider: DEBBIE Fofana CNP NPI: [id number]   Ordering User:  DEBBIE Fofana - [address] [facility] [id number] [phone] - [address] [id number]   Phone:  [phone]  Fax:  [phone]

## 2023-09-20 RX ORDER — VALSARTAN 80 MG/1
80 TABLET ORAL DAILY
Qty: 30 TABLET | OUTPATIENT
Start: 2023-09-20

## 2023-10-04 RX ORDER — VALSARTAN 80 MG/1
80 TABLET ORAL DAILY
Qty: 90 TABLET | Refills: 2 | Status: SHIPPED | OUTPATIENT
Start: 2023-10-04

## 2023-10-04 NOTE — TELEPHONE ENCOUNTER
Requested Prescriptions     Pending Prescriptions Disp Refills    valsartan (DIOVAN) 80 MG tablet [Pharmacy Med Name: VALSARTAN 80 MG TABLET] 30 tablet      Sig: TAKE ONE TABLET BY MOUTH DAILY            Last Office Visit: 2/13/2023     Next Office Visit: 10/10/2023

## 2023-10-10 ENCOUNTER — OFFICE VISIT (OUTPATIENT)
Dept: FAMILY MEDICINE CLINIC | Age: 53
End: 2023-10-10

## 2023-10-10 ENCOUNTER — OFFICE VISIT (OUTPATIENT)
Dept: CARDIOLOGY CLINIC | Age: 53
End: 2023-10-10

## 2023-10-10 VITALS
SYSTOLIC BLOOD PRESSURE: 118 MMHG | RESPIRATION RATE: 16 BRPM | DIASTOLIC BLOOD PRESSURE: 80 MMHG | TEMPERATURE: 96.6 F | HEIGHT: 72 IN | OXYGEN SATURATION: 93 % | HEART RATE: 114 BPM | WEIGHT: 315 LBS | BODY MASS INDEX: 42.66 KG/M2

## 2023-10-10 VITALS
BODY MASS INDEX: 42.66 KG/M2 | SYSTOLIC BLOOD PRESSURE: 122 MMHG | WEIGHT: 315 LBS | OXYGEN SATURATION: 93 % | HEIGHT: 72 IN | DIASTOLIC BLOOD PRESSURE: 74 MMHG | HEART RATE: 109 BPM

## 2023-10-10 DIAGNOSIS — I50.22 CHRONIC HFREF (HEART FAILURE WITH REDUCED EJECTION FRACTION) (HCC): ICD-10-CM

## 2023-10-10 DIAGNOSIS — I10 ESSENTIAL HYPERTENSION: ICD-10-CM

## 2023-10-10 DIAGNOSIS — R20.2 NUMBNESS AND TINGLING OF FOOT: ICD-10-CM

## 2023-10-10 DIAGNOSIS — I20.0 UNSTABLE ANGINA (HCC): Primary | ICD-10-CM

## 2023-10-10 DIAGNOSIS — R00.0 TACHYCARDIA: ICD-10-CM

## 2023-10-10 DIAGNOSIS — E11.65 TYPE 2 DIABETES MELLITUS WITH HYPERGLYCEMIA, WITHOUT LONG-TERM CURRENT USE OF INSULIN (HCC): Primary | ICD-10-CM

## 2023-10-10 DIAGNOSIS — R20.0 NUMBNESS AND TINGLING OF FOOT: ICD-10-CM

## 2023-10-10 DIAGNOSIS — I50.42 CHRONIC COMBINED SYSTOLIC AND DIASTOLIC HEART FAILURE (HCC): ICD-10-CM

## 2023-10-10 DIAGNOSIS — R06.02 SOB (SHORTNESS OF BREATH) ON EXERTION: ICD-10-CM

## 2023-10-10 DIAGNOSIS — I42.0 DILATED CARDIOMYOPATHY (HCC): ICD-10-CM

## 2023-10-10 DIAGNOSIS — E66.01 MORBID OBESITY DUE TO EXCESS CALORIES (HCC): ICD-10-CM

## 2023-10-10 DIAGNOSIS — R00.2 PALPITATIONS: ICD-10-CM

## 2023-10-10 LAB — HBA1C MFR BLD: 7.5 %

## 2023-10-10 PROCEDURE — 83036 HEMOGLOBIN GLYCOSYLATED A1C: CPT | Performed by: NURSE PRACTITIONER

## 2023-10-10 PROCEDURE — 93000 ELECTROCARDIOGRAM COMPLETE: CPT | Performed by: NURSE PRACTITIONER

## 2023-10-10 PROCEDURE — 3074F SYST BP LT 130 MM HG: CPT | Performed by: NURSE PRACTITIONER

## 2023-10-10 PROCEDURE — 3078F DIAST BP <80 MM HG: CPT | Performed by: NURSE PRACTITIONER

## 2023-10-10 PROCEDURE — 99214 OFFICE O/P EST MOD 30 MIN: CPT | Performed by: NURSE PRACTITIONER

## 2023-10-10 PROCEDURE — 99215 OFFICE O/P EST HI 40 MIN: CPT | Performed by: NURSE PRACTITIONER

## 2023-10-10 PROCEDURE — 3051F HG A1C>EQUAL 7.0%<8.0%: CPT | Performed by: NURSE PRACTITIONER

## 2023-10-10 RX ORDER — INDOMETHACIN 50 MG/1
50 CAPSULE ORAL 3 TIMES DAILY
Qty: 30 CAPSULE | Refills: 2 | Status: SHIPPED | OUTPATIENT
Start: 2023-10-10

## 2023-10-10 RX ORDER — SILDENAFIL 25 MG/1
25 TABLET, FILM COATED ORAL PRN
Qty: 30 TABLET | Refills: 2 | Status: CANCELLED | OUTPATIENT
Start: 2023-10-10

## 2023-10-10 RX ORDER — METOPROLOL SUCCINATE 25 MG/1
12.5 TABLET, EXTENDED RELEASE ORAL DAILY
Qty: 45 TABLET | Refills: 3 | Status: SHIPPED | OUTPATIENT
Start: 2023-10-10

## 2023-10-10 NOTE — ASSESSMENT & PLAN NOTE
Lab Results   Component Value Date    LABA1C 7.5 10/10/2023    LABA1C 7.5 02/13/2023    LABA1C 8.1 09/27/2022    LABA1C 10.2 08/15/2022    LABA1C 10.7 05/26/2021     Stable

## 2023-11-13 RX ORDER — INDOMETHACIN 50 MG/1
50 CAPSULE ORAL 3 TIMES DAILY
Qty: 30 CAPSULE | Refills: 2 | Status: SHIPPED | OUTPATIENT
Start: 2023-11-13

## 2023-11-13 NOTE — TELEPHONE ENCOUNTER
Requested Prescriptions     Pending Prescriptions Disp Refills    indomethacin (INDOCIN) 50 MG capsule [Pharmacy Med Name: INDOMETHACIN 50 MG CAPSULE] 30 capsule 2     Sig: TAKE ONE CAPSULE BY MOUTH THREE TIMES A DAY          Last Office Visit: 10/10/2023     Next Office Visit: 1/16/2024     Last Labs: 2/13/2023

## 2024-03-05 RX ORDER — SILDENAFIL 25 MG/1
25 TABLET, FILM COATED ORAL DAILY PRN
Qty: 30 TABLET | Refills: 0 | Status: SHIPPED | OUTPATIENT
Start: 2024-03-05

## 2024-04-11 ENCOUNTER — TELEPHONE (OUTPATIENT)
Dept: FAMILY MEDICINE CLINIC | Age: 54
End: 2024-04-11

## 2024-04-11 RX ORDER — TORSEMIDE 100 MG/1
50 TABLET ORAL DAILY
Qty: 90 TABLET | Refills: 3 | OUTPATIENT
Start: 2024-04-11

## 2024-04-11 RX ORDER — HYDROCHLOROTHIAZIDE 25 MG/1
25 TABLET ORAL DAILY
Qty: 30 TABLET | Refills: 11 | Status: SHIPPED | OUTPATIENT
Start: 2024-04-11

## 2024-04-11 RX ORDER — TORSEMIDE 100 MG/1
50 TABLET ORAL DAILY
Qty: 15 TABLET | Refills: 2 | Status: SHIPPED | OUTPATIENT
Start: 2024-04-11

## 2024-04-11 RX ORDER — TORSEMIDE 100 MG/1
50 TABLET ORAL DAILY
Qty: 15 TABLET | Refills: 2 | Status: SHIPPED | OUTPATIENT
Start: 2024-04-11 | End: 2024-04-11 | Stop reason: SDUPTHER

## 2024-04-11 NOTE — TELEPHONE ENCOUNTER
Patient called requesting a refill stated that he was driving; heard ever other word. Patient spoke clearly for a second then phone going in and out. I asked which pharmacy he stated \"send to anyone we want\".Please call patient to clarify. Thanks      torsemide (DEMADEX) 100 MG tablet Take 0.5 tablets by mouth daily Dispense: 90 tablet, Refills: 3 ordered --    02/07/2023 -- Brooke Francois, APRN - CNP       Summary: Take 0.5 tablets by mouth daily, Disp-90 tablet, R-3 Normal  Guidelines: Dose, Route, Frequency: 50 mg, Oral, DAILYMed Note: Dx Associated: Start Date & Time: 02/07/2023Ord/Sold: 02/07/2023 (O)Ordered On: 02/07/2023ReportPharmacy: [facility] [id number] [facility] [address] [phone]

## 2024-04-12 RX ORDER — SILDENAFIL 50 MG/1
50 TABLET, FILM COATED ORAL DAILY PRN
Qty: 30 TABLET | Refills: 1 | Status: SHIPPED | OUTPATIENT
Start: 2024-04-12

## 2024-05-28 NOTE — TELEPHONE ENCOUNTER
Refill Request     Last Seen: 10/10/2023    Last Written: 5/16/23      Next Appointment:   No future appointments.          Requested Prescriptions     Pending Prescriptions Disp Refills    allopurinol (ZYLOPRIM) 100 MG tablet [Pharmacy Med Name: ALLOPURINOL 100 MG TABLET] 30 tablet      Sig: TAKE ONE TABLET BY MOUTH DAILY

## 2024-05-30 RX ORDER — ALLOPURINOL 100 MG/1
100 TABLET ORAL DAILY
Qty: 30 TABLET | Refills: 0 | Status: SHIPPED | OUTPATIENT
Start: 2024-05-30

## 2024-11-04 RX ORDER — SILDENAFIL 25 MG/1
25 TABLET, FILM COATED ORAL DAILY PRN
Qty: 30 TABLET | Refills: 0 | OUTPATIENT
Start: 2024-11-04

## 2024-11-07 NOTE — TELEPHONE ENCOUNTER
Requested Prescriptions     Pending Prescriptions Disp Refills    sildenafil (VIAGRA) 50 MG tablet 30 tablet 1     Sig: Take 1 tablet by mouth daily as needed for Erectile Dysfunction          Last Office Visit: 10/10/2023     Next Office Visit: Visit date not found

## 2024-11-08 RX ORDER — SILDENAFIL 50 MG/1
50 TABLET, FILM COATED ORAL DAILY PRN
Qty: 30 TABLET | Refills: 1 | Status: SHIPPED | OUTPATIENT
Start: 2024-11-08

## 2025-04-08 RX ORDER — SILDENAFIL 50 MG/1
50 TABLET, FILM COATED ORAL DAILY PRN
Qty: 30 TABLET | Refills: 1 | OUTPATIENT
Start: 2025-04-08

## 2025-04-08 NOTE — TELEPHONE ENCOUNTER
Medication:   Requested Prescriptions     Pending Prescriptions Disp Refills    sildenafil (VIAGRA) 50 MG tablet [Pharmacy Med Name: SILDENAFIL 50 MG TABLET] 30 tablet 1     Sig: TAKE 1 TABLET BY MOUTH DAILY AS NEEDED FOR ERECTILE DYSFUNCTION     Last Filled:      Last appt: 10/10/2023   Next appt: Visit date not found

## 2025-04-24 RX ORDER — TORSEMIDE 100 MG/1
50 TABLET ORAL DAILY
Qty: 15 TABLET | Refills: 2 | OUTPATIENT
Start: 2025-04-24

## 2025-07-08 RX ORDER — ALLOPURINOL 100 MG/1
100 TABLET ORAL DAILY
Qty: 30 TABLET | Refills: 0 | OUTPATIENT
Start: 2025-07-08

## 2025-07-08 RX ORDER — SILDENAFIL 50 MG/1
50 TABLET, FILM COATED ORAL DAILY PRN
Qty: 30 TABLET | Refills: 1 | OUTPATIENT
Start: 2025-07-08

## 2025-07-08 RX ORDER — INDOMETHACIN 50 MG/1
50 CAPSULE ORAL 3 TIMES DAILY
Qty: 30 CAPSULE | Refills: 2 | OUTPATIENT
Start: 2025-07-08